# Patient Record
Sex: FEMALE | Race: WHITE | Employment: FULL TIME | URBAN - METROPOLITAN AREA
[De-identification: names, ages, dates, MRNs, and addresses within clinical notes are randomized per-mention and may not be internally consistent; named-entity substitution may affect disease eponyms.]

---

## 2021-12-07 ENCOUNTER — HOSPITAL ENCOUNTER (OUTPATIENT)
Age: 51
Setting detail: OBSERVATION
Discharge: HOME | End: 2021-12-08
Attending: PSYCHIATRY & NEUROLOGY | Admitting: PSYCHIATRY & NEUROLOGY
Payer: COMMERCIAL

## 2021-12-07 DIAGNOSIS — F41.9 ANXIETY: Primary | ICD-10-CM

## 2021-12-07 PROBLEM — F33.2 MDD (MAJOR DEPRESSIVE DISORDER), RECURRENT SEVERE, WITHOUT PSYCHOSIS (HCC): Status: ACTIVE | Noted: 2021-12-07

## 2021-12-07 LAB
ACETAMINOPHEN LEVEL: <5 UG/ML (ref 10–30)
ALBUMIN SERPL-MCNC: 4.5 G/DL (ref 3.5–4.6)
ALP BLD-CCNC: 59 U/L (ref 40–130)
ALT SERPL-CCNC: 16 U/L (ref 0–33)
AMPHETAMINE SCREEN, URINE: NORMAL
ANION GAP SERPL CALCULATED.3IONS-SCNC: 13 MEQ/L (ref 9–15)
AST SERPL-CCNC: 14 U/L (ref 0–35)
BARBITURATE SCREEN URINE: NORMAL
BASOPHILS ABSOLUTE: 0.1 K/UL (ref 0–0.2)
BASOPHILS RELATIVE PERCENT: 0.6 %
BENZODIAZEPINE SCREEN, URINE: NORMAL
BILIRUB SERPL-MCNC: <0.2 MG/DL (ref 0.2–0.7)
BILIRUBIN URINE: NEGATIVE
BLOOD, URINE: NEGATIVE
BUN BLDV-MCNC: 15 MG/DL (ref 6–20)
CALCIUM SERPL-MCNC: 9.4 MG/DL (ref 8.5–9.9)
CANNABINOID SCREEN URINE: NORMAL
CHLORIDE BLD-SCNC: 101 MEQ/L (ref 95–107)
CHOLESTEROL, TOTAL: 193 MG/DL (ref 0–199)
CLARITY: CLEAR
CO2: 23 MEQ/L (ref 20–31)
COCAINE METABOLITE SCREEN URINE: NORMAL
COLOR: YELLOW
CREAT SERPL-MCNC: 0.46 MG/DL (ref 0.5–0.9)
EKG ATRIAL RATE: 67 BPM
EKG P AXIS: 54 DEGREES
EKG P-R INTERVAL: 142 MS
EKG Q-T INTERVAL: 426 MS
EKG QRS DURATION: 98 MS
EKG QTC CALCULATION (BAZETT): 450 MS
EKG R AXIS: 45 DEGREES
EKG T AXIS: 47 DEGREES
EKG VENTRICULAR RATE: 67 BPM
EOSINOPHILS ABSOLUTE: 0.1 K/UL (ref 0–0.7)
EOSINOPHILS RELATIVE PERCENT: 0.8 %
ETHANOL PERCENT: NORMAL G/DL
ETHANOL: <10 MG/DL (ref 0–0.08)
GFR AFRICAN AMERICAN: >60
GFR NON-AFRICAN AMERICAN: >60
GLOBULIN: 2.6 G/DL (ref 2.3–3.5)
GLUCOSE BLD-MCNC: 101 MG/DL (ref 60–115)
GLUCOSE BLD-MCNC: 149 MG/DL (ref 70–99)
GLUCOSE BLD-MCNC: 240 MG/DL (ref 60–115)
GLUCOSE URINE: NEGATIVE MG/DL
HCG(URINE) PREGNANCY TEST: NEGATIVE
HCT VFR BLD CALC: 44 % (ref 37–47)
HDLC SERPL-MCNC: 47 MG/DL (ref 40–59)
HEMOGLOBIN: 14.5 G/DL (ref 12–16)
KETONES, URINE: NEGATIVE MG/DL
LDL CHOLESTEROL CALCULATED: 119 MG/DL (ref 0–129)
LEUKOCYTE ESTERASE, URINE: NEGATIVE
LYMPHOCYTES ABSOLUTE: 2.9 K/UL (ref 1–4.8)
LYMPHOCYTES RELATIVE PERCENT: 22.8 %
Lab: NORMAL
MCH RBC QN AUTO: 29.5 PG (ref 27–31.3)
MCHC RBC AUTO-ENTMCNC: 33 % (ref 33–37)
MCV RBC AUTO: 89.4 FL (ref 82–100)
METHADONE SCREEN, URINE: NORMAL
MONOCYTES ABSOLUTE: 0.8 K/UL (ref 0.2–0.8)
MONOCYTES RELATIVE PERCENT: 6.1 %
NEUTROPHILS ABSOLUTE: 8.8 K/UL (ref 1.4–6.5)
NEUTROPHILS RELATIVE PERCENT: 69.7 %
NITRITE, URINE: NEGATIVE
OPIATE SCREEN URINE: NORMAL
OXYCODONE URINE: NORMAL
PDW BLD-RTO: 14.6 % (ref 11.5–14.5)
PERFORMED ON: ABNORMAL
PERFORMED ON: NORMAL
PH UA: 5 (ref 5–9)
PHENCYCLIDINE SCREEN URINE: NORMAL
PLATELET # BLD: 267 K/UL (ref 130–400)
POTASSIUM SERPL-SCNC: 4.1 MEQ/L (ref 3.4–4.9)
PROPOXYPHENE SCREEN: NORMAL
PROTEIN UA: NEGATIVE MG/DL
RBC # BLD: 4.92 M/UL (ref 4.2–5.4)
SALICYLATE, SERUM: <0.3 MG/DL (ref 15–30)
SARS-COV-2, NAAT: NOT DETECTED
SODIUM BLD-SCNC: 137 MEQ/L (ref 135–144)
SPECIFIC GRAVITY UA: 1.01 (ref 1–1.03)
TOTAL CK: 29 U/L (ref 0–170)
TOTAL PROTEIN: 7.1 G/DL (ref 6.3–8)
TRIGL SERPL-MCNC: 135 MG/DL (ref 0–150)
TSH SERPL DL<=0.05 MIU/L-ACNC: 1.56 UIU/ML (ref 0.44–3.86)
URINE REFLEX TO CULTURE: NORMAL
UROBILINOGEN, URINE: 0.2 E.U./DL
WBC # BLD: 12.6 K/UL (ref 4.8–10.8)

## 2021-12-07 PROCEDURE — 80307 DRUG TEST PRSMV CHEM ANLYZR: CPT

## 2021-12-07 PROCEDURE — 99220 PR INITIAL OBSERVATION CARE/DAY 70 MINUTES: CPT | Performed by: PSYCHIATRY & NEUROLOGY

## 2021-12-07 PROCEDURE — 87635 SARS-COV-2 COVID-19 AMP PRB: CPT

## 2021-12-07 PROCEDURE — 36415 COLL VENOUS BLD VENIPUNCTURE: CPT

## 2021-12-07 PROCEDURE — 93005 ELECTROCARDIOGRAM TRACING: CPT | Performed by: PSYCHIATRY & NEUROLOGY

## 2021-12-07 PROCEDURE — 84703 CHORIONIC GONADOTROPIN ASSAY: CPT

## 2021-12-07 PROCEDURE — 6370000000 HC RX 637 (ALT 250 FOR IP): Performed by: STUDENT IN AN ORGANIZED HEALTH CARE EDUCATION/TRAINING PROGRAM

## 2021-12-07 PROCEDURE — 6370000000 HC RX 637 (ALT 250 FOR IP): Performed by: PSYCHIATRY & NEUROLOGY

## 2021-12-07 PROCEDURE — G0378 HOSPITAL OBSERVATION PER HR: HCPCS

## 2021-12-07 PROCEDURE — 80179 DRUG ASSAY SALICYLATE: CPT

## 2021-12-07 PROCEDURE — 81003 URINALYSIS AUTO W/O SCOPE: CPT

## 2021-12-07 PROCEDURE — 82550 ASSAY OF CK (CPK): CPT

## 2021-12-07 PROCEDURE — 84443 ASSAY THYROID STIM HORMONE: CPT

## 2021-12-07 PROCEDURE — 99284 EMERGENCY DEPT VISIT MOD MDM: CPT

## 2021-12-07 PROCEDURE — 80143 DRUG ASSAY ACETAMINOPHEN: CPT

## 2021-12-07 PROCEDURE — 80061 LIPID PANEL: CPT

## 2021-12-07 PROCEDURE — 93010 ELECTROCARDIOGRAM REPORT: CPT | Performed by: INTERNAL MEDICINE

## 2021-12-07 PROCEDURE — 82077 ASSAY SPEC XCP UR&BREATH IA: CPT

## 2021-12-07 PROCEDURE — 85025 COMPLETE CBC W/AUTO DIFF WBC: CPT

## 2021-12-07 PROCEDURE — 80053 COMPREHEN METABOLIC PANEL: CPT

## 2021-12-07 RX ORDER — TRAZODONE HYDROCHLORIDE 50 MG/1
50 TABLET ORAL NIGHTLY PRN
Status: DISCONTINUED | OUTPATIENT
Start: 2021-12-07 | End: 2021-12-07

## 2021-12-07 RX ORDER — BENZTROPINE MESYLATE 1 MG/ML
2 INJECTION INTRAMUSCULAR; INTRAVENOUS 2 TIMES DAILY PRN
Status: DISCONTINUED | OUTPATIENT
Start: 2021-12-07 | End: 2021-12-08 | Stop reason: HOSPADM

## 2021-12-07 RX ORDER — ESCITALOPRAM OXALATE 10 MG/1
15 TABLET ORAL DAILY
Status: DISCONTINUED | OUTPATIENT
Start: 2021-12-07 | End: 2021-12-08 | Stop reason: HOSPADM

## 2021-12-07 RX ORDER — HYDROXYZINE PAMOATE 50 MG/1
50 CAPSULE ORAL EVERY 6 HOURS PRN
Status: DISCONTINUED | OUTPATIENT
Start: 2021-12-07 | End: 2021-12-08 | Stop reason: HOSPADM

## 2021-12-07 RX ORDER — HALOPERIDOL 5 MG
5 TABLET ORAL EVERY 6 HOURS PRN
Status: DISCONTINUED | OUTPATIENT
Start: 2021-12-07 | End: 2021-12-08 | Stop reason: HOSPADM

## 2021-12-07 RX ORDER — HALOPERIDOL 5 MG/ML
5 INJECTION INTRAMUSCULAR EVERY 6 HOURS PRN
Status: DISCONTINUED | OUTPATIENT
Start: 2021-12-07 | End: 2021-12-08 | Stop reason: HOSPADM

## 2021-12-07 RX ORDER — MAGNESIUM HYDROXIDE/ALUMINUM HYDROXICE/SIMETHICONE 120; 1200; 1200 MG/30ML; MG/30ML; MG/30ML
30 SUSPENSION ORAL PRN
Status: DISCONTINUED | OUTPATIENT
Start: 2021-12-07 | End: 2021-12-08 | Stop reason: HOSPADM

## 2021-12-07 RX ORDER — TRAZODONE HYDROCHLORIDE 50 MG/1
50 TABLET ORAL NIGHTLY
Status: DISCONTINUED | OUTPATIENT
Start: 2021-12-07 | End: 2021-12-08 | Stop reason: HOSPADM

## 2021-12-07 RX ORDER — HYDROXYZINE HYDROCHLORIDE 50 MG/ML
50 INJECTION, SOLUTION INTRAMUSCULAR EVERY 6 HOURS PRN
Status: DISCONTINUED | OUTPATIENT
Start: 2021-12-07 | End: 2021-12-08 | Stop reason: HOSPADM

## 2021-12-07 RX ORDER — ACETAMINOPHEN 80 MG
TABLET,CHEWABLE ORAL ONCE
Status: COMPLETED | OUTPATIENT
Start: 2021-12-07 | End: 2021-12-07

## 2021-12-07 RX ORDER — DEXTROSE MONOHYDRATE 50 MG/ML
100 INJECTION, SOLUTION INTRAVENOUS PRN
Status: DISCONTINUED | OUTPATIENT
Start: 2021-12-07 | End: 2021-12-08 | Stop reason: HOSPADM

## 2021-12-07 RX ORDER — ACETAMINOPHEN 325 MG/1
650 TABLET ORAL EVERY 4 HOURS PRN
Status: DISCONTINUED | OUTPATIENT
Start: 2021-12-07 | End: 2021-12-08 | Stop reason: HOSPADM

## 2021-12-07 RX ORDER — DEXTROSE MONOHYDRATE 25 G/50ML
12.5 INJECTION, SOLUTION INTRAVENOUS PRN
Status: DISCONTINUED | OUTPATIENT
Start: 2021-12-07 | End: 2021-12-08 | Stop reason: HOSPADM

## 2021-12-07 RX ORDER — ESCITALOPRAM OXALATE 10 MG/1
10 TABLET ORAL DAILY
Status: ON HOLD | COMMUNITY
End: 2021-12-08 | Stop reason: HOSPADM

## 2021-12-07 RX ORDER — LORAZEPAM 1 MG/1
2 TABLET ORAL ONCE
Status: COMPLETED | OUTPATIENT
Start: 2021-12-07 | End: 2021-12-07

## 2021-12-07 RX ORDER — ACETAMINOPHEN 325 MG/1
650 TABLET ORAL ONCE
Status: COMPLETED | OUTPATIENT
Start: 2021-12-07 | End: 2021-12-07

## 2021-12-07 RX ORDER — NICOTINE POLACRILEX 4 MG
15 LOZENGE BUCCAL PRN
Status: DISCONTINUED | OUTPATIENT
Start: 2021-12-07 | End: 2021-12-08 | Stop reason: HOSPADM

## 2021-12-07 RX ADMIN — TRAZODONE HYDROCHLORIDE 50 MG: 50 TABLET ORAL at 20:40

## 2021-12-07 RX ADMIN — ESCITALOPRAM OXALATE 15 MG: 10 TABLET ORAL at 13:02

## 2021-12-07 RX ADMIN — ACETAMINOPHEN 650 MG: 325 TABLET, FILM COATED ORAL at 06:25

## 2021-12-07 RX ADMIN — Medication: at 13:02

## 2021-12-07 RX ADMIN — HYDROXYZINE PAMOATE 50 MG: 50 CAPSULE ORAL at 13:06

## 2021-12-07 RX ADMIN — LORAZEPAM 2 MG: 1 TABLET ORAL at 06:26

## 2021-12-07 ASSESSMENT — SLEEP AND FATIGUE QUESTIONNAIRES
DIFFICULTY STAYING ASLEEP: YES
AVERAGE NUMBER OF SLEEP HOURS: 8
DO YOU USE A SLEEP AID: YES
DIFFICULTY FALLING ASLEEP: NO
DO YOU HAVE DIFFICULTY SLEEPING: YES
DIFFICULTY ARISING: NO
SLEEP PATTERN: DISTURBED/INTERRUPTED SLEEP
RESTFUL SLEEP: NO

## 2021-12-07 ASSESSMENT — PATIENT HEALTH QUESTIONNAIRE - PHQ9: SUM OF ALL RESPONSES TO PHQ QUESTIONS 1-9: 19

## 2021-12-07 ASSESSMENT — ENCOUNTER SYMPTOMS
ABDOMINAL PAIN: 0
NAUSEA: 0
COUGH: 0
SHORTNESS OF BREATH: 0
WHEEZING: 0
PHOTOPHOBIA: 0
VOMITING: 0

## 2021-12-07 ASSESSMENT — PAIN SCALES - GENERAL: PAINLEVEL_OUTOF10: 5

## 2021-12-07 NOTE — PROGRESS NOTES
Per [facility] pharmacy, pt has not filled lexapro since beginning of November for a 30 day supply.  Electronically signed by Doe Jorge RN on 12/7/21 at 4:31 PM EST

## 2021-12-07 NOTE — ED NOTES
Report received from [facility]. Patient resting in bed. Voices no complaints or concerns at this time. Will continue to monitor.      Navjot Walters RN  12/07/21 1882       Najvot Walters RN  12/07/21 6542

## 2021-12-07 NOTE — PROGRESS NOTES
Patient was laying in bed in her room. She was awake and alert. She had a sad affect, was worrisome, anxious and was crying the entire times during the leisure assessment. Patient is depressed , overwhelmed and has many stressors. She was recently diagnosed with colon cancer and she was told she is diabetic. She stated she is feeling hopeless. Her fiance is an addict and she was upset he was using again. Patient was blaming herself, stating \"why am I not enough. \"    Her mother recently passed away and she was the caregiver. She was having racing thoughts and could not sleep. She feels she has limited support. She has no leisure interests.  Electronically signed by Venessa Siu, [address] on 12/7/2021 at 5:35 PM

## 2021-12-07 NOTE — ED NOTES
Report given to [facility] on 3WT. [employer] called for belongings and transport to [facility] [employer].      Jared Ruiz RN  12/07/21 7838

## 2021-12-07 NOTE — PROGRESS NOTES
Pt reports recently being diagnosed with colon cancer, states she has an appointment with a specialist at [facility] tomorrow at 1500. Pt also states she was recently told she is diabetic and is not on insulin/medication or checking her sugar at this time. Pt states she is going to see a doctor soon for diabetes to figure out if she needs medications. Notified hospitalist-Carmen Stone NP of diabetes diagnosis.  Electronically signed by Rosi Oscar RN on 12/7/21 at 5:05 PM EST

## 2021-12-07 NOTE — PROGRESS NOTES
Report received from [facility] from [facility]. Pt coming to unit on pink slip with a diagnosis of anxiety. 52yo female, BIB self for increased anxiety/ panic attack. Patient presents tearful, pressured speech, blaming self. Patient denies any SI, however makes multiple passive DW statements through out interview ie: \"I am at the end of my rope and just want it to be done, I don't know what to do, Im never good enough so why try\" Patient ws recently dx with DM and colon CA. Mother recently passed away. Juan is an addict and was using tonight. Per pt \"That was the final straw, I couldn't stop shaking and so I came here, my fireymundo messed up again. \" Patient reports a lot of responsibilities with no help and can not \"do this anymore. \" reports racing thoughts tonight and unable to stop crying and unable to sleep at all so drove self to ED. Pt denies drug use, endorses social ETOH use. Pt denies HI, AVH at this time. Pt reports thoughts of SI in the past, denies current d/t \"I have too many responsibilities. \" No h/o self harm reported.  Covid (-) Electronically signed by Jason Ferro RN on 12/7/21 at 9:52 AM EST

## 2021-12-07 NOTE — H&P
[facility] - Department of Psychiatry    History and Physical - Adult         CHIEF COMPLAINT:  Depression, SI    History obtained from:  patient    Patient was seen after discussing with the treatment team and reviewing the chart        CIRCUMSTANCES OF ADMISSION:     54yo female, BIB self for increased anxiety/ panic attack. Patient presents tearful, pressured speech, blaming self. Patient makes multiple passive DW statements through out interview ie: \"I am at the end of my rope and just want it to be done, I don't know what to do, Im never good enough so why try\" Patient ws recently dx with DM and colon CA. Mother recently passed away. Juan is an addict and was using tonight. Per pt \"That was the final straw, I couldn't stop shaking and so I came here, my fireymundo messed up again. \" Patient reports a lot of responsibilities with no help and can not \"do this anymore. \" reports racing thoughts tonight and unable to stop crying and unable to sleep at all so drove self to ED. HISTORY OF PRESENT ILLNESS:      The patient is a 46 y.o. female engaged live with [employer] work as para legal with significant past history of MDD, PHILIPPE, 2 daughters 29 and 23  Depression:  Severity: Rating mood to be around 2/10 (10- good)  Quality:melancholic  Worse all day  Content: Hopeless, worthless and helpless feeling  Suicidal thoughts - passive thoughts on and off  Associated symptoms:  Poor concentration, anhedonia, decrease motivation  Sleep and appetite- poor    Pt has been feeling anxious and panicky  Nothing is going to work, no purpose in living or fighting    Stressors:  Patient ws recently dx with DM and colon CA.  mom  in 2021  Patient ws recently dx with DM and colon CA. Juan is an addict and was using tonight. Work related stress      The patient is currently receiving care for the above psychiatric illness.     Medications Prior to Admission:   No medications prior to admission. Compliance:Lexapro    Psychiatric Review of Systems       Depression: yes     Priti or Hypomania:  no     Panic Attacks:  yes     Phobias:  no     Obsessions and Compulsions:  no     PTSD : no     Hallucinations:  no     Delusions:  no    Substance Abuse History:  ETOH: no   Marijuana: no  Opiates: no  Other Drugs: no      Past Psychiatric History:  Prior Diagnosis:  MDD, PHILIPPE  Psychiatrist: no  Therapist:no  Hospitalization: no  Hx of Suicidal Attempts: no  Hx of violence:  no  ECT: no  Previous discontinued Psychiatric Med Trials:     Past Medical History:        Diagnosis Date    Depression        Past Surgical History:    History reviewed. No pertinent surgical history. Allergies:   Patient has no known allergies. Family History  History reviewed. No pertinent family history. Social History:  Born and Raised: [place]  Describes Childhood:   supportive  Education: College  Employment: Employed full time  Relationships: engaged  Children: 2  Current Support: romantic partner    Legal Hx: none  Access to weapons?:  No      EXAMINATION:    REVIEW OF SYSTEMS:    ROS:  [x] All negative/unchanged except if checked.  Explain positive(checked items) below:  [] Constitutional  [] Eyes  [] Ear/Nose/Mouth/Throat  [] Respiratory  [] CV  [] GI  []   [] Musculoskeletal  [] Skin/Breast  [] Neurological  [] Endocrine  [] Heme/Lymph  [] Allergic/Immunologic    Explanation:     Vitals:  /60   Pulse 77   Temp 98.6 °F (37 °C) (Oral)   Resp 18   Ht 5' 3\" (1.6 m)   Wt 160 lb (72.6 kg)   LMP  (LMP Unknown)   SpO2 94%   BMI 28.34 kg/m²      Neurologic Exam:   Muscle Strength & Tone: full ROM  Gait: normal gait   Involuntary Movements: No    Mental Status Examination:    Level of consciousness:  within normal limits   Appearance:  ill-appearing  Behavior/Motor:  psychomotor retardation  Attitude toward examiner:  cooperative  Speech:  slow   Mood: decreased range and depressed  Affect:  Labile tearful  Thought processes:  slow   Thought content:  Suicidal Ideation:  passive  Cognition:  oriented to person, place, and time   Concentration poor  Memory intact  Insight poor   Judgement fair   Fund of Knowledge adequate    Mini Mental Status 30/30      DIAGNOSIS:     MDD recurrent severe   Panic disorder       RISK ASSESSMENT:    SUICIDE RISK ASSESSMENT: Moderate  HOMICIDE: Low  AGITATION/VIOLENCE: Low  ELOPEMENT: Low    LABS: REVIEWED TODAY:  Recent Labs     12/07/21 0412   WBC 12.6*   HGB 14.5        Recent Labs     12/07/21 0412      K 4.1      CO2 23   BUN 15   CREATININE 0.46*   GLUCOSE 149*     Recent Labs     12/07/21 0412   BILITOT <0.2   ALKPHOS 59   AST 14   ALT 16     Lab Results   Component Value Date    LABAMPH Neg 12/07/2021    BARBSCNU Neg 12/07/2021    LABBENZ Neg 12/07/2021    LABMETH Neg 12/07/2021    OPIATESCREENURINE Neg 12/07/2021    PHENCYCLIDINESCREENURINE Neg 12/07/2021    ETOH <10 12/07/2021     Lab Results   Component Value Date    TSH 1.560 12/07/2021     No results found for: LITHIUM  No results found for: VALPROATE, CBMZ  No results found for: LITHIUM, VALPROATE    FURTHER LABS ORDERED :      Radiology   No results found. EKG: TRACING REVIEWED    TREATMENT PLAN:    Risk Management:  suicide risk    Collateral Information:  Will obtain collateral information from the family or friends. Will obtain medical records as appropriate from out patient providers  Will consult the hospitalist for a physical exam to rule out any co-morbid physical condition. Home medication Reconciled       New Medications started during this admission :    See orders  Prn Haldol 5mg and Vistaril 50mg q6hr for extreme agitation. Trazodone as ordered for insomnia  Vistaril as ordered for anxiety  Discussed with the patient risk, benefit, alternative and common side effects for the  proposed medication treatment. Patient is consenting to the treatment.     Psychotherapy: Encourage participation in milieu and group therapy  Individual therapy as needed      Electronically signed by Yanira Parra MD on 12/7/2021 at 10:44 AM

## 2021-12-07 NOTE — PROGRESS NOTES
Pt assessed this afternoon after admission assessments. Reports feeling better at this moment with depression /10, and anxiety /10. Denies SI, HI, AVH at present time. Pt states being very overwhelmed with life, her Mom recently  and has some financial problems as well. Her Daughter called (Fidel Devries) and was concerned about her status. Relayed message to pt. Sleeping most of the afternoon and currently in her room resting.

## 2021-12-07 NOTE — ED PROVIDER NOTES
[address] [facility] ED  EMERGENCY DEPARTMENT ENCOUNTER      Pt Name: Carole Ahumada  MRN: [id number]  [place] 1970  Date of evaluation: 12/7/2021  Provider: Christine Blue Dr       Chief Complaint   Patient presents with    Panic Attack         HISTORY OF PRESENT ILLNESS   (Location/Symptom, Timing/Onset, Context/Setting, Quality, Duration, Modifying Factors, Severity)  Note limiting factors. Carole Ahumada is a 46 y.o. female who presents to the emergency department for evaluation of anxiety. Patient states she is overwhelmed has a lot of stressors in her life currently and developed a panic attack tonight. She states she could not sleep had racing thoughts. Per ED triage nurse patient is making passive comments about suicide stating it may be better if she is not here. She denies any active suicidal ideations or plan. No history of suicide attempt. She is not taking any medications. She denies AVH HI. No medical complaints. Denies EtOH or drugs. HPI    Nursing Notes were reviewed. REVIEW OF SYSTEMS    (2-9 systems for level 4, 10 or more for level 5)     Review of Systems   Constitutional: Negative for chills and fever. HENT: Negative for congestion. Eyes: Negative for photophobia. Respiratory: Negative for cough, shortness of breath and wheezing. Cardiovascular: Negative for chest pain and palpitations. Gastrointestinal: Negative for abdominal pain, nausea and vomiting. Genitourinary: Negative for dysuria, frequency and hematuria. Musculoskeletal: Negative for myalgias. Allergic/Immunologic: Negative for immunocompromised state. Neurological: Negative for dizziness, weakness and headaches. Psychiatric/Behavioral: Positive for dysphoric mood. Negative for hallucinations and suicidal ideas. The patient is nervous/anxious. All other systems reviewed and are negative.       Except as noted above the remainder of the review of systems was reviewed and negative. PAST MEDICAL HISTORY     Past Medical History:   Diagnosis Date    Depression          SURGICAL HISTORY     History reviewed. No pertinent surgical history. CURRENT MEDICATIONS       Previous Medications    No medications on file       ALLERGIES     Patient has no known allergies. FAMILY HISTORY     History reviewed. No pertinent family history. SOCIAL HISTORY       Social History     Socioeconomic History    Marital status:      Spouse name: None    Number of children: None    Years of education: None    Highest education level: None   Occupational History    None   Tobacco Use    Smoking status: Current Every Day Smoker    Smokeless tobacco: Never Used   Substance and Sexual Activity    Alcohol use: None    Drug use: None    Sexual activity: None   Other Topics Concern    None   Social History Narrative    None     Social Determinants of Health     Financial Resource Strain:     Difficulty of Paying Living Expenses: Not on file   Food Insecurity:     Worried About Running Out of Food in the Last Year: Not on file    Pedro of Food in the Last Year: Not on file   Transportation Needs:     Lack of Transportation (Medical): Not on file    Lack of Transportation (Non-Medical):  Not on file   Physical Activity:     Days of Exercise per Week: Not on file    Minutes of Exercise per Session: Not on file   Stress:     Feeling of Stress : Not on file   Social Connections:     Frequency of Communication with Friends and Family: Not on file    Frequency of Social Gatherings with Friends and Family: Not on file    Attends Restorationist Services: Not on file    Active Member of Clubs or Organizations: Not on file    Attends Club or Organization Meetings: Not on file    Marital Status: Not on file   Intimate Partner Violence:     Fear of Current or Ex-Partner: Not on file    Emotionally Abused: Not on file    Physically Abused: Not on file    Sexually Abused: Not on file   Housing Stability:     Unable to Pay for Housing in the Last Year: Not on file    Number of Leela in the Last Year: Not on file    Unstable Housing in the Last Year: Not on file       SCREENINGS                        PHYSICAL EXAM    (up to 7 for level 4, 8 or more for level 5)     ED Triage Vitals [12/07/21 0334]   BP Temp Temp Source Pulse Resp SpO2 Height Weight   (!) 155/87 98.6 °F (37 °C) Oral 109 20 95 % 5' 3\" (1.6 m) 160 lb (72.6 kg)       Physical Exam  Constitutional:       General: She is not in acute distress. Appearance: She is well-developed. She is not ill-appearing, toxic-appearing or diaphoretic. HENT:      Head: Normocephalic and atraumatic. Nose: Nose normal.      Mouth/Throat:      Mouth: Mucous membranes are moist.   Eyes:      Pupils: Pupils are equal, round, and reactive to light. Cardiovascular:      Rate and Rhythm: Normal rate and regular rhythm. Heart sounds: No murmur heard. No friction rub. No gallop. Pulmonary:      Effort: Pulmonary effort is normal.      Breath sounds: Normal breath sounds. No wheezing, rhonchi or rales. Abdominal:      General: Bowel sounds are normal. There is no distension. Palpations: Abdomen is soft. Tenderness: There is no abdominal tenderness. Musculoskeletal:         General: No swelling. Cervical back: Normal range of motion. Right lower leg: No edema. Left lower leg: No edema. Skin:     General: Skin is warm and dry. Capillary Refill: Capillary refill takes less than 2 seconds. Findings: No rash. Neurological:      General: No focal deficit present. Mental Status: She is alert and oriented to person, place, and time. Psychiatric:         Attention and Perception: Attention normal.         Mood and Affect: Affect is tearful. Speech: Speech normal.         Behavior: Behavior is cooperative. Thought Content:  Thought content does not include homicidal or suicidal ideation. Thought content does not include homicidal or suicidal plan. Comments: Very tearful          DIAGNOSTIC RESULTS     EKG: All EKG's are interpreted by the Emergency Department Physician who either signs or Co-signs this chart in the absence of a cardiologist.      RADIOLOGY:   Non-plain film images such as CT, Ultrasound and MRI are read by the radiologist. Plain radiographic images are visualized and preliminarily interpreted by the emergency physician with the below findings:      Interpretation per the Radiologist below, if available at the time of this note:    No orders to display         ED BEDSIDE ULTRASOUND:   Performed by ED Physician - none    LABS:  Labs Reviewed   ACETAMINOPHEN LEVEL - Abnormal; Notable for the following components:       Result Value    Acetaminophen Level <5 (*)     All other components within normal limits   CBC WITH AUTO DIFFERENTIAL - Abnormal; Notable for the following components:    WBC 12.6 (*)     RDW 14.6 (*)     Neutrophils Absolute 8.8 (*)     All other components within normal limits   COMPREHENSIVE METABOLIC PANEL - Abnormal; Notable for the following components:    Glucose 149 (*)     CREATININE 0.46 (*)     All other components within normal limits   SALICYLATE LEVEL - Abnormal; Notable for the following components:    Salicylate, Serum <6.9 (*)     All other components within normal limits   COVID-19, RAPID   CK   ETHANOL   LIPID PANEL   PREGNANCY, URINE   TSH WITHOUT REFLEX   URINE DRUG SCREEN   URINE RT REFLEX TO CULTURE       All other labs were within normal range or not returned as of this dictation. EMERGENCY DEPARTMENT COURSE and DIFFERENTIAL DIAGNOSIS/MDM:   Vitals:    Vitals:    12/07/21 0334 12/07/21 0739   BP: (!) 155/87 109/60   Pulse: 109 77   Resp: 20 18   Temp: 98.6 °F (37 °C)    TempSrc: Oral    SpO2: 95% 94%   Weight: 160 lb (72.6 kg)    Height: 5' 3\" (1.6 m)        MDM    Medically cleared.     Admitted to 3W psychiatry unit with dx of anxiety. Dr. Gearld Collet is accepting physician. Stable for admission. REASSESSMENT          CRITICAL CARE TIME   Total Critical Care time was 0 minutes, excluding separately reportable procedures. There was a high probability of clinically significant/life threatening deterioration in the patient's condition which required my urgent intervention. CONSULTS:  IP CONSULT TO HOSPITALIST  IP CONSULT TO SOCIAL WORK    PROCEDURES:  Unless otherwise noted below, none     Procedures        FINAL IMPRESSION      1. Anxiety          DISPOSITION/PLAN   DISPOSITION Admitted 12/07/2021 06:45:03 AM      PATIENT REFERRED TO:  No follow-up provider specified. DISCHARGE MEDICATIONS:  New Prescriptions    No medications on file     Controlled Substances Monitoring:     No flowsheet data found.     (Please note that portions of this note were completed with a voice recognition program.  Efforts were made to edit the dictations but occasionally words are mis-transcribed.)    María Morales PA-C (electronically signed)             María Morales PA-C  12/07/21 7757

## 2021-12-07 NOTE — PROGRESS NOTES
Pt did not attend healthy living group at 1300 despite staff encouragement.  Electronically signed by Ilda Ferrer RN on 12/7/21 at 4:24 PM EST

## 2021-12-07 NOTE — PROGRESS NOTES
Pt arrived from the [place] via [facility] accompanied by two staff. Pt ambulated to room without difficulty. Skin check completed by two R.N.'s.  No skin issues noted. Contraband check completed. Toiletries and water placed in room. Pt oriented to place and time. Ate  lunch and currently resting in room.

## 2021-12-07 NOTE — PROGRESS NOTES
Behavioral Services  Medicare Certification Upon Admission    I certify that this patient's inpatient psychiatric hospital admission is medically necessary for:    [x] (1) Treatment which could reasonably be expected to improve this patient's condition,       [x] (2) Or for diagnostic study;     AND     [x](2) The inpatient psychiatric services are provided while the individual is under the care of a physician and are included in the individualized plan of care.     Estimated length of stay/service 3-5 days    Plan for post-hospital care OP care    Electronically signed by Yanira Parra MD on 12/7/2021 at 10:44 AM

## 2021-12-07 NOTE — PROGRESS NOTES
Patient did not attend group despite staff encouragement.   Electronically signed by Ron Arboleda on 12/7/2021 at 5:29 PM

## 2021-12-07 NOTE — PROGRESS NOTES
Patient arrived to unit via wheelchair accompanied by staff. Pt ambulated with steady gait to assigned room. Skin assessment and contraband check complete by this nurse and Geovani Churchill RN. Skin intact, no contraband found. Pt provided with breakfast, hygiene supplies, and fluids. Pt states she wants to eat and nap for a bit before completing admission assessment. Will continue to monitor.  Electronically signed by Faizan Lopez RN on 12/7/21 at 9:54 AM EST

## 2021-12-07 NOTE — PROGRESS NOTES
Pt. refused to attend the 1100 skills group, despite staff encouragement. Electronically signed by Kiley Mason, [address] on 12/7/2021 at 3:03 PM

## 2021-12-07 NOTE — ED NOTES
Awaiting [facility] admit per report from [facility]. Resting quietly with no acute distress noted.      Louis Díaz RN  12/07/21 1931

## 2021-12-07 NOTE — ED NOTES
Continue to rest quietly with eyes closed & no acute distress noted. Breakfast tray remains untouched.      Shruthi Johnson RN  12/07/21 0705

## 2021-12-07 NOTE — PLAN OF CARE
Pt arrived from [place] via [facility] accompanied by two staff. Pt ambulated to room without difficulty. Skin check completed by two R.N.'s.  No skin issues noted. Contraband check completed. Toiletries and water placed in room. Pt oriented to place and time. Currently resting in room.

## 2021-12-08 VITALS
TEMPERATURE: 97.4 F | OXYGEN SATURATION: 95 % | RESPIRATION RATE: 18 BRPM | DIASTOLIC BLOOD PRESSURE: 67 MMHG | HEART RATE: 88 BPM | SYSTOLIC BLOOD PRESSURE: 106 MMHG | WEIGHT: 160 LBS | HEIGHT: 63 IN | BODY MASS INDEX: 28.35 KG/M2

## 2021-12-08 PROBLEM — F41.0 PANIC DISORDER: Status: ACTIVE | Noted: 2021-12-07

## 2021-12-08 LAB
GLUCOSE BLD-MCNC: 132 MG/DL (ref 60–115)
PERFORMED ON: ABNORMAL

## 2021-12-08 PROCEDURE — 6370000000 HC RX 637 (ALT 250 FOR IP): Performed by: PSYCHIATRY & NEUROLOGY

## 2021-12-08 PROCEDURE — G0378 HOSPITAL OBSERVATION PER HR: HCPCS

## 2021-12-08 PROCEDURE — 99217 PR OBSERVATION CARE DISCHARGE MANAGEMENT: CPT | Performed by: PSYCHIATRY & NEUROLOGY

## 2021-12-08 RX ORDER — TRAZODONE HYDROCHLORIDE 50 MG/1
50 TABLET ORAL NIGHTLY
Qty: 15 TABLET | Refills: 2 | Status: SHIPPED | OUTPATIENT
Start: 2021-12-08

## 2021-12-08 RX ORDER — ESCITALOPRAM OXALATE 5 MG/1
15 TABLET ORAL DAILY
Qty: 45 TABLET | Refills: 1 | Status: SHIPPED | OUTPATIENT
Start: 2021-12-09

## 2021-12-08 RX ADMIN — ESCITALOPRAM OXALATE 15 MG: 10 TABLET ORAL at 09:21

## 2021-12-08 ASSESSMENT — LIFESTYLE VARIABLES: HISTORY_ALCOHOL_USE: NO

## 2021-12-08 NOTE — GROUP NOTE
Group Therapy Note    Date: 12/8/2021    Group Start Time: 1100  Group End Time: 1200  Group Topic: Psychoeducation    [facility]    Kaleb Navarro, CTRS        Group Therapy Note    Attendees:4         Patient's Goal:  \"to stay positive\"    Notes:  Pt. attended the 1100 skill group. Worked on picture with interst. Shared her desire to travel again. Happy to be going home. Status After Intervention:  Improved    Participation Level:  Active Listener and Interactive    Participation Quality: Appropriate, Attentive and Sharing      Speech:  normal      Thought Process/Content: Logical      Affective Functioning: Congruent      Mood: calm, happy      Level of consciousness:  Alert, Oriented x4 and Attentive      Response to Learning: Progressing to goal      Endings: None Reported    Modes of Intervention: Education, Support, Socialization and Activity      Discipline Responsible: Psychoeducational Specialist      Signature:  Michael Suarez

## 2021-12-08 NOTE — PROGRESS NOTES
Morning Community Meeting Topics    Carole Ahumada attended the morning community meeting on 12/8/21. Topics discussed today     [x] Introduction   Day of the week and date   Mask distribution   Current mask requirements  [x]Teams   Explanation of  Green and Blue team criteria   Nurses assigned to each team for today   Explanation about green and blue paper  o Date  o Patient's Name  o Patient's Nurse  o Goals  [x] Visitation   Announce the visiting hours for the day   Announce which team is allowed to have visitors for the day   Review any updated Covid 19 requirements for visitors during visitation  o Vaccine Card or negative Covid test within 48 hours of visit  o State Identification   Patients are reminded to alert the  at least 1 hour before visitation   [x] Unit Orientation   Coffee use   Phone location and etiquette   Shower locations  [employer] and dryer location and process   Common area expectations   Staff rounds expectation  [x] Meals    Educate patient to the menu  o The patient is encouraged to fill out the menu to get preferences at mealtime  o The patient is educated that if they do not fill out the menu, they will get the standard tray  o The coffee pot is decaf, patient encouraged to order regular coffee from menu.    Educate patient to the meal process   Patient encouraged to eat snacks provided twice daily  o Snacks may stay in patient room     [x] Discharge Process   Discharge expectations   Fill out the survey after discharge   [x] Hygiene   Daily showers encouraged  o Showers availability discussed    Daily dressing encouraged  o Discussed wearing street clothing   Education provided on where to place linens and clothing  o Linens in the hamper  o personal clothing does not go into the linen hamper  [x] Group    Patient encouraged to attend group provided   Time of Group Meetings discussed   Gentle reminder that attendance is a Physician order  [x] Movement   Chair exercises completed   Stretching completed  Notes: GOAL:\" to stay positive\" Electronically signed by Ashley Gandara on 12/8/2021 at 1:05 PM

## 2021-12-08 NOTE — CARE COORDINATION
Brief Intervention and Referral to Treatment Summary    Patient was provided PHQ-9, AUDIT and DAST Screening:      PHQ-9 Score: 19  AUDIT Score:  0  DAST Score:  0    Patients substance use is considered     Low Risk/[facility]  Moderate Risk  Harmful  Dependent    Patients depression is considered:     Minimal  Mild   Moderatex  Moderately Severe  Severe    Brief Education Was Provided    Patient was receptivex  Patient was not receptive      Brief Intervention Is Provided (Only for AUDIT or DAST)     Patient reports readiness to decrease and/or stop use and a plan was discussed   Patient denies readiness to decrease and/or stop use and a plan was not discussed      Recommendations/Referrals for Brief and/or Specialized Treatment Provided to Patient   Patient denies alcohol or drug use. Patient will follow up with [facility].

## 2021-12-08 NOTE — DISCHARGE INSTR - DIET

## 2021-12-08 NOTE — CARE COORDINATION
FAMILY COLLATERAL NOTE    Family/Support Name: Cain Heimlich  Contact #: [phone]  Relationship to Pt: daughter      Placed call to above for collateral. No answer, did not leave a message.      Response:  No answer        Elisha Singletary, [facility]

## 2021-12-08 NOTE — CARE COORDINATION
FAMILY COLLATERAL NOTE    Family/Support Name: Bhavik Shook  Contact #: [phone]  Relationship to Pt: giulianoreymundo      Placed call to above for collateral. No answer, left a message requesting a return call.      Response:  No answer, KIRSTEN Boyle, [facility]

## 2021-12-08 NOTE — PROGRESS NOTES
Patient did not attend group despite staff encouragement.   Electronically signed by Tres Hargrove on 12/7/2021 at 9:50 PM

## 2021-12-08 NOTE — CONSULTS
[address] MEDICINE    HISTORY AND PHYSICAL EXAM    PATIENT NAME:  Daksha Parra    MRN:  [id number]  SERVICE DATE:  2021   SERVICE TIME:  9:07 AM    Primary Care Physician: Deloris Acosta MD         SUBJECTIVE  CHIEF COMPLAINT:  Medically appropriate for inpatient psychiatry admission. Consult for medical H/P encounter. HPI:  This is a 46 y.o. female who presents with  PMHx DM type II, depression, GERD, positive colon cancer screening,  presented to emergency room with anxiety and panic attacks. Reports feeling overwhelmed and stressed out due to recent positive colon screening and issues with her significant other's addiction. Denies SI. Patient cleared from emergency room for psychiatric care. Patient Seen, Chart, Labs, Radiologystudies, and Consults reviewed. Patient denies headache, chest pain, shortness of breath, N/V/D/C, fever/chills.        PAST MEDICAL HISTORY:    Past Medical History:   Diagnosis Date    [facility] ([facility] [address] [facility]     Depression     [facility]     GERD (gastroesophageal reflux disease)      PAST SURGICAL HISTORY:    Past Surgical History:   Procedure Laterality Date     SECTION      x's 2    EYE SURGERY      OTHER SURGICAL HISTORY      uterine ablation     FAMILY HISTORY:    Family History   Problem Relation Age of Onset    COPD Mother      SOCIAL HISTORY:    Social History     Socioeconomic History    Marital status:      Spouse name: Christiano Marmolejo    Number of children: 2    Years of education: 15    Highest education level: Not on file   Occupational History    Occupation: para legal   Tobacco Use    Smoking status: Current Every Day Smoker     Packs/day: 1.00     Years: 20.00     Pack years: 20.00     Types: Cigarettes     Start date: 2021    Smokeless tobacco: Never Used   Vaping Use    Vaping Use: Never used   Substance and Sexual Activity    Alcohol use: Not Currently    Drug use: Never    Sexual activity: Yes Partners: Male   Other Topics Concern    Not on file   Social History Narrative    Not on file     Social Determinants of Health     Financial Resource Strain:     Difficulty of Paying Living Expenses: Not on file   Food Insecurity:     Worried About Running Out of Food in the Last Year: Not on file    Pedro of Food in the Last Year: Not on file   Transportation Needs:     Lack of Transportation (Medical): Not on file    Lack of Transportation (Non-Medical):  Not on file   Physical Activity:     Days of Exercise per Week: Not on file    Minutes of Exercise per Session: Not on file   Stress:     Feeling of Stress : Not on file   Social Connections:     Frequency of Communication with Friends and Family: Not on file    Frequency of Social Gatherings with Friends and Family: Not on file    Attends Gnosticist Services: Not on file    Active Member of [address] [place] [address] or Organizations: Not on file    Attends Club or Organization Meetings: Not on file    Marital Status: Not on file   Intimate Partner Violence:     Fear of Current or Ex-Partner: Not on file    Emotionally Abused: Not on file    Physically Abused: Not on file    Sexually Abused: Not on file   Housing Stability:     Unable to Pay for Housing in the Last Year: Not on file    Number of [place] in the Last Year: Not on file    Unstable Housing in the Last Year: Not on file     MEDICATIONS:    Current Facility-Administered Medications   Medication Dose Route Frequency Provider Last Rate Last Admin    acetaminophen (TYLENOL) tablet 650 mg  650 mg Oral Q4H PRN Odalys Traylor MD        magnesium hydroxide (MILK OF MAGNESIA) 400 MG/5ML suspension 30 mL  30 mL Oral Daily PRN Odalys Traylor MD        aluminum & magnesium hydroxide-simethicone (MAALOX) 200-200-20 MG/5ML suspension 30 mL  30 mL Oral PRN Odalys Traylor MD        haloperidol (HALDOL) tablet 5 mg  5 mg Oral Q6H PRN Odalys Traylor MD        Or    haloperidol lactate (HALDOL) injection 5 mg  5 mg IntraMUSCular Q6H PRN Sandro Maza MD        benztropine mesylate (COGENTIN) injection 2 mg  2 mg IntraMUSCular BID PRN Sandro Maza MD        hydrOXYzine (VISTARIL) capsule 50 mg  50 mg Oral Q6H PRN Sandro Maza MD   50 mg at 12/07/21 1306    Or    hydrOXYzine (VISTARIL) injection 50 mg  50 mg IntraMUSCular Q6H PRN Sandro Maza MD        escitalopram (LEXAPRO) tablet 15 mg  15 mg Oral Daily Tati Irizarry MD   15 mg at 12/07/21 1302    traZODone (DESYREL) tablet 50 mg  50 mg Oral Nightly Tati Irizarry MD   50 mg at 12/07/21 2040    insulin lispro (HUMALOG) injection vial 0-12 Units  0-12 Units SubCUTAneous TID WC Lilliana Bras, APRN - NP        insulin lispro (HUMALOG) injection vial 0-6 Units  0-6 Units SubCUTAneous Nightly Terrell Bras, APRN - NP        glucose (GLUTOSE) 40 % oral gel 15 g  15 g Oral PRN Terrell Bras, APRN - NP        dextrose 50 % IV solution  12.5 g IntraVENous PRN Terrell Bras, APRN - NP        glucagon (rDNA) injection 1 mg  1 mg IntraMUSCular PRN Terrell Bras, APRN - NP        dextrose 5 % solution  100 mL/hr IntraVENous PRN Terrell Bras, APRN - NP           ALLERGIES: Patient has no known allergies. REVIEW OF SYSTEM:   ROS as noted in HPI, 12 point ROS reviewed and otherwise negative. OBJECTIVE  PHYSICAL EXAM: /77   Pulse 78   Temp 99 °F (37.2 °C)   Resp 16   Ht 5' 3\" (1.6 m)   Wt 160 lb (72.6 kg)   LMP  (LMP Unknown)   SpO2 96%   Breastfeeding No   BMI 28.34 kg/m²   CONSTITUTIONAL:  awake, alert, cooperative, no apparent distress, and appears stated age  EYES: Pupils equal, round and reactive to light, conjunctiva normal  ENT:  Normocephalic, without obvious abnormality, atraumatic, sinuses nontender on palpation, external ears without lesions, oral pharynx with moist mucus membranes, tonsils without erythema or exudates, gums normal and good dentition.   NECK:  Supple, symmetrical, trachea midline  LUNGS: Clear to auscultation bilaterally, no crackles or wheezing  CARDIOVASCULAR: Regular rate and rhythm, normal S1 and S2  ABDOMEN: Normal bowel sounds, soft, non-distended  MUSCULOSKELETAL:  There is no redness, warmth, or swelling of the joints. NEUROLOGIC:  Awake, alert, oriented to name, place and time. SKIN:  Warm and dry  DATA:     Diagnostic tests reviewed for today's visit:    Most recent labs and imaging results reviewed. ASSESSMENT AND PLAN    46 y.o. female with PMH DM type II, depression, GERD, positive colon cancer screening, presented with the following:    Anxiety   Patient admitted to behavorial health for evaluation and treatment     DM type II: Newly diagnosed. Has not seen PCP since being notified of DM diagnoses. Does not wish to start metformin while on 3W. States she wants to wait until seen by PCP    Positive colon cancer screening: States she has an appt scheduled with GI today. This is only a history and physical examination and not medical management. The patient is to contact and follow up with their primary care physician and go over any abnormal labs, imaging, findings, medical concerns, or conditions that we have and have not addressed during this encounter.     Plan of care discussed with: patient    SIGNATURE: ALYSHA Amaya NP  DATE: December 8, 2021  TIME: 9:07 AM

## 2021-12-08 NOTE — CARE COORDINATION
Psychosocial Assessment    Current Level of Psychosocial Functioning     Independent x  Dependent    Minimal Assist     Comments:  Lives with daughter and fiance    Psychosocial High Risk Factors (check all that apply)    Unable to obtain meds   Chronic illness/pain    Substance abuse   Lack of Family Support   Financial stress   Isolation   Inadequate Community Resources  Suicide attempt(s)  Not taking medications   Victim of crime   Developmental Delay  Unable to manage personal needs    Age 72 or older   Homeless  No transportation   Readmission within 30 days  Unemployment  Traumatic Event    Family/Supports identified: fiance and daughter    Sexual Orientation:  heterosexual  Patient Strengths:    Patient Barriers: depression and grief issues    Safety plan: comepleted    CMHC/MH history: [facility]    Plan of Care:  medication management, group/individual therapies, family meetings, psycho -education, treatment team meetings to assist with stabilization    Initial Discharge Plan:  Call collateral. Intake at [facility]    Clinical Summary:  Patient stated she was overwhelmed with her medical issues, life stressors and mom passing in June. Patient was her caretaker for 5 years. Patient stated that she never had thoughts of hurting herself she was just very down. Patient is agreeable to counseling and will return to work next week. Denies current SI/HI.

## 2021-12-08 NOTE — CARE COORDINATION
Discharge instructions reviewed verbally and in writing including f/u appointments. Patient verbalizes understanding and signed as such. All belongings returned for discharge. Patient denies SI, HI, A/V hallucinations, mood is stable.    Discharged with own transportation home

## 2021-12-08 NOTE — PROGRESS NOTES
Patient did not attend group despite staff encouragement.   Electronically signed by Dorian Dennis on 12/7/2021 at 9:50 PM

## 2021-12-08 NOTE — DISCHARGE SUMMARY
DISCHARGE SUMMARY      Patient ID:  Chucky Fisher  [id number]  16 y.o.  1970      Admit date: 2021    Discharge date and time: 2021    Admitting Physician: Ora Dancer, MD     Discharge Physician: Dr Teddy Mcmullen MD    Admission Diagnoses: Anxiety [F41.9]  MDD (major depressive disorder), recurrent severe, without psychosis ([facility] [address] [F33.2]    Admission Condition: poor    Discharged Condition: stable    Admission Circumstance:       54yo female, BIB self for increased anxiety/ panic attack. Patient presents tearful, pressured speech, blaming self. Patient makes multiple passive DW statements through out interview ie: \"I am at the end of my rope and just want it to be done, I don't know what to do, Im never good enough so why try\" Patient ws recently dx with DM and colon CA. Mother recently passed away. Juan is an addict and was using tonight. Per pt \"That was the final straw, I couldn't stop shaking and so I came here, my fiance messed up again. \" Patient reports a lot of responsibilities with no help and can not \"do this anymore. \" reports racing thoughts tonight and unable to stop crying and unable to sleep at all so drove self to ED.      HISTORY OF PRESENT ILLNESS:       The patient is a 46 y.o. 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  mom  in 2021  Patient ws recently dx with DM and colon CA. Juan is an addict and was using tonight.   Work related stress        The patient is currently receiving care for the above psychiatric illness.     Medications Prior to Admission:   Prescriptions Prior to Admission   No medications prior to admission.        Compliance:"[employer]apro     Psychiatric Review of Systems       Depression: yes     Priti or Hypomania:  no     Panic Attacks:  yes     Phobias:  no     Obsessions and Compulsions:  no     PTSD : no     Hallucinations:  no     Delusions:  no     Substance Abuse History:  ETOH: no   Marijuana: no  Opiates: no  Other Drugs: no        Past Psychiatric History:  Prior Diagnosis:  MDD, PHILIPPE  Psychiatrist: no  Therapist:no  Hospitalization: no  Hx of Suicidal Attempts: no  Hx of violence:  no  ECT: no      PAST MEDICAL/PSYCHIATRIC HISTORY:   Past Medical History:   Diagnosis Date    [facility] ([facility] [address] [facility]     Depression     Diabetes mellitus (HCC)     GERD (gastroesophageal reflux disease)        FAMILY/SOCIAL HISTORY:  Family History   Problem Relation Age of Onset    COPD Mother      Social History     Socioeconomic History    Marital status:      Spouse name: Gabriella Jhaveri    Number of children: 2    Years of education: 15    Highest education level: Not on file   Occupational History    Occupation: para legal   Tobacco Use    Smoking status: Current Every Day Smoker     Packs/day: 1.00     Years: 20.00     Pack years: 20.00     Types: Cigarettes     Start date: 12/6/2021    Smokeless tobacco: Never Used   Vaping Use    Vaping Use: Never used   Substance and Sexual Activity    Alcohol use: Not Currently    Drug use: Never    Sexual activity: Yes     Partners: Male   Other Topics Concern    Not on file   Social History Narrative    Not on file     Social Determinants of Health     Financial Resource Strain:     Difficulty of Paying Living Expenses: Not on file   Food Insecurity:     Worried About Running Out of Food in the Last Year: Not on file    Pedro of Food in the Last Year: Not on file Transportation Needs:     Lack of Transportation (Medical): Not on file    Lack of Transportation (Non-Medical):  Not on file   Physical Activity:     Days of Exercise per Week: Not on file    Minutes of Exercise per Session: Not on file   Stress:     Feeling of Stress : Not on file   Social Connections:     Frequency of Communication with Friends and Family: Not on file    Frequency of Social Gatherings with Friends and Family: Not on file    Attends Pentecostal Services: Not on file    Active Member of Clubs or Organizations: Not on file    Attends Club or Organization Meetings: Not on file    Marital Status: Not on file   Intimate Partner Violence:     Fear of Current or Ex-Partner: Not on file    Emotionally Abused: Not on file    Physically Abused: Not on file    Sexually Abused: Not on file   Housing Stability:     Unable to Pay for Housing in the Last Year: Not on file    Number of Places Lived in the Last Year: Not on file    Unstable Housing in the Last Year: Not on file       MEDICATIONS:    Current Facility-Administered Medications:     acetaminophen (TYLENOL) tablet 650 mg, 650 mg, Oral, Q4H PRN, Remberto Holland MD    magnesium hydroxide (MILK OF MAGNESIA) 400 MG/5ML suspension 30 mL, 30 mL, Oral, Daily PRN, Remberto Holland MD    aluminum & magnesium hydroxide-simethicone (MAALOX) 200-200-20 MG/5ML suspension 30 mL, 30 mL, Oral, PRN, Remberto Holland MD    haloperidol (HALDOL) tablet 5 mg, 5 mg, Oral, Q6H PRN **OR** haloperidol lactate (HALDOL) injection 5 mg, 5 mg, IntraMUSCular, Q6H PRN, Remberto Holland MD    benztropine mesylate (COGENTIN) injection 2 mg, 2 mg, IntraMUSCular, BID PRN, Remberto Holland MD    hydrOXYzine (VISTARIL) capsule 50 mg, 50 mg, Oral, Q6H PRN, 50 mg at 12/07/21 1306 **OR** hydrOXYzine (VISTARIL) injection 50 mg, 50 mg, IntraMUSCular, Q6H PRN, Remberto Holland MD    escitalopram (LEXAPRO) tablet 15 mg, 15 mg, Oral, Daily, Dion Panda MD, 15 mg at 12/08/21 7462    traZODone (DESYREL) tablet 50 mg, 50 mg, Oral, Nightly, Neville Adhikari MD, 50 mg at 12/07/21 2040    glucose (GLUTOSE) 40 % oral gel 15 g, 15 g, Oral, PRN, Joi Hollis, APRN - NP    dextrose 50 % IV solution, 12.5 g, IntraVENous, PRN, Joi Hollis, APRN - NP    glucagon (rDNA) injection 1 mg, 1 mg, IntraMUSCular, PRN, Joi Hollis, APRN - NP    dextrose 5 % solution, 100 mL/hr, IntraVENous, PRN, Joi Hollis, APRN - NP    Examination:  /67   Pulse 88   Temp 97.4 °F (36.3 °C) (Oral)   Resp 18   Ht 5' 3\" (1.6 m)   Wt 160 lb (72.6 kg)   LMP  (LMP Unknown)   SpO2 95%   Breastfeeding No   BMI 28.34 kg/m²   Gait - steady    HOSPITAL COURSE[de-identified]  Following admission to the hospital, patient had a complete physical exam and blood work up  Patient was monitored closely with suicide precaution  Patient was started on medication as listed below  Pt is worried about missing her appointment today  Was encouraged to participate in group and other milieu activity  Patient started to feel better with this combination of treatment. Significant progress in the symptoms since admission. Mood better, with the score of 2/10 - bad  No AVH or paranoid thoughts  No Hopeless or worthless feeling  No active SI/HI  Appetite:  [x] Normal  [] Increased  [] Decreased    Sleep:       [x] Normal  [] Fair       [] Poor            Energy:    [x] Normal  [] Increased  [] Decreased     SI [] Present  [x] Absent  HI  []Present  [x] Absent   Aggression:  [] yes  [] no  Patient is [x] able  [] unable to CONTRACT FOR SAFETY   Medication side effects(SE):  [x] None(Psych.  Meds.) [] Other      Mental Status Examination on discharge:    Level of consciousness:  within normal limits   Appearance:  well-appearing  Behavior/Motor:  no abnormalities noted  Attitude toward examiner:  attentive and good eye contact  Speech:  spontaneous, normal rate and normal volume   Mood: less depressed  Affect:  mood congruent  Thought processes:  linear   Thought content:  Suicidal Ideation:  denies suicidal ideation  Delusions:  no evidence of delusions  Perceptual Disturbance:  denies any perceptual disturbance  Cognition:  oriented to person, place, and time   Concentration intact  Memory intact  Insight good   Judgement fair   Fund of Knowledge adequate      ASSESSMENT:  Patient symptoms are:  [x] Well controlled  [x] Improving  [] Worsening  [] No change      Diagnosis:  Principal Problem:    MDD (major depressive disorder), recurrent severe, without psychosis ([facility] [address]  Active Problems:    Panic disorder  Resolved Problems:    * No resolved hospital problems. *      LABS:    Recent Labs     12/07/21 0412   WBC 12.6*   HGB 14.5        Recent Labs     12/07/21 0412      K 4.1      CO2 23   BUN 15   CREATININE 0.46*   GLUCOSE 149*     Recent Labs     12/07/21 0412   BILITOT <0.2   ALKPHOS 59   AST 14   ALT 16     Lab Results   Component Value Date    LABAMPH Neg 12/07/2021    BARBSCNU Neg 12/07/2021    LABBENZ Neg 12/07/2021    LABMETH Neg 12/07/2021    OPIATESCREENURINE Neg 12/07/2021    PHENCYCLIDINESCREENURINE Neg 12/07/2021    ETOH <10 12/07/2021     Lab Results   Component Value Date    TSH 1.560 12/07/2021     No results found for: LITHIUM  No results found for: VALPROATE, CBMZ    RISK ASSESSMENT AT DISCHARGE: Low risk for suicide and homicide. Treatment Plan:  Reviewed current Medications with the patient. Education provided on the complaince with treatment. Risks, benefits, side effects, drug-to-drug interactions and alternatives to treatment were discussed. Encourage patient to attend outpatient follow up appointment and therapy. Patient was advised to call the outpatient provider, visit the nearest ED or call 911 if symptoms are not manageable. Patient's family member was contacted prior to the discharge.          Medication List      START taking these medications    traZODone 50 MG tablet  Commonly known as: DESYREL  Take 1 tablet by mouth nightly        CHANGE how you take these medications    escitalopram 5 MG tablet  Commonly known as: LEXAPRO  Take 3 tablets by mouth daily  Start taking on: December 9, 2021  What changed:   · medication strength  · how much to take           Where to Get Your Medications      These medications were sent to [facility] [address] [address] [facility], [address] [place] [address] - P [phone] - F [phone] [address] [facility] [address]    Phone: [phone]   · escitalopram 5 MG tablet  · traZODone 50 MG tablet           Reason for more than one antipsychotic:   [x] N/A  [] 3 failed monotherapy(drugs tried):  [] Cross over to a new antipsychotic  [] Taper to monotherapy from polypharmacy  [] Augmentation of Clozapine therapy due to treatment resistance to single therapy        TIME SPEND - 35 MINUTES TO COMPLETE THE EVALUATION, DISCHARGE SUMMARY, MEDICATION RECONCILIATION AND FOLLOW UP CARE     Signed:  Alicia Rodrigues MD  12/8/2021  10:56 AM

## 2023-07-14 PROBLEM — M20.11 HALLUX VALGUS WITH BUNIONS OF RIGHT FOOT: Status: ACTIVE | Noted: 2023-07-14

## 2023-07-14 PROBLEM — M21.611 HALLUX VALGUS WITH BUNIONS OF RIGHT FOOT: Status: ACTIVE | Noted: 2023-07-14

## 2023-07-14 PROBLEM — B96.89 BACTERIAL VAGINITIS: Status: ACTIVE | Noted: 2023-07-14

## 2023-07-14 PROBLEM — B00.89 HERPES DERMATITIS: Status: ACTIVE | Noted: 2023-07-14

## 2023-07-14 PROBLEM — J02.9 SORE THROAT: Status: ACTIVE | Noted: 2023-07-14

## 2023-07-14 PROBLEM — N92.0 MENORRHAGIA: Status: ACTIVE | Noted: 2023-07-14

## 2023-07-14 PROBLEM — L30.1 DYSHIDROSIS: Status: ACTIVE | Noted: 2023-07-14

## 2023-07-14 PROBLEM — L57.0 ACTINIC KERATOSIS: Status: ACTIVE | Noted: 2023-07-14

## 2023-07-14 PROBLEM — N95.0 POSTMENOPAUSAL BLEEDING: Status: ACTIVE | Noted: 2023-07-14

## 2023-07-14 PROBLEM — I78.1 TELANGIECTASIA: Status: ACTIVE | Noted: 2023-07-14

## 2023-07-14 PROBLEM — R87.810 ASCUS WITH POSITIVE HIGH RISK HPV CERVICAL: Status: ACTIVE | Noted: 2023-07-14

## 2023-07-14 PROBLEM — E55.9 VITAMIN D DEFICIENCY: Status: ACTIVE | Noted: 2023-07-14

## 2023-07-14 PROBLEM — M25.50 ARTHRALGIA: Status: ACTIVE | Noted: 2023-07-14

## 2023-07-14 PROBLEM — M77.40 METATARSALGIA: Status: ACTIVE | Noted: 2023-07-14

## 2023-07-14 PROBLEM — R19.5 POSITIVE COLORECTAL CANCER SCREENING USING COLOGUARD TEST: Status: ACTIVE | Noted: 2023-07-14

## 2023-07-14 PROBLEM — R10.13 DYSPEPSIA: Status: ACTIVE | Noted: 2023-07-14

## 2023-07-14 PROBLEM — L98.9 CHANGING SKIN LESION: Status: ACTIVE | Noted: 2023-07-14

## 2023-07-14 PROBLEM — M54.16 LUMBAR RADICULAR PAIN: Status: ACTIVE | Noted: 2023-07-14

## 2023-07-14 PROBLEM — B35.3 TINEA PEDIS: Status: ACTIVE | Noted: 2023-07-14

## 2023-07-14 PROBLEM — R87.610 ASCUS WITH POSITIVE HIGH RISK HPV CERVICAL: Status: ACTIVE | Noted: 2023-07-14

## 2023-07-14 PROBLEM — E11.9 TYPE 2 DIABETES MELLITUS (MULTI): Status: ACTIVE | Noted: 2023-07-14

## 2023-07-14 PROBLEM — M70.71 BURSITIS OF HIP, RIGHT: Status: ACTIVE | Noted: 2023-07-14

## 2023-07-14 PROBLEM — M54.50 LOW BACK PAIN: Status: ACTIVE | Noted: 2023-07-14

## 2023-07-14 PROBLEM — L30.9 ECZEMA: Status: ACTIVE | Noted: 2023-07-14

## 2023-07-14 PROBLEM — M75.80 ROTATOR CUFF TENDONITIS: Status: ACTIVE | Noted: 2023-07-14

## 2023-07-14 PROBLEM — N76.0 BACTERIAL VAGINITIS: Status: ACTIVE | Noted: 2023-07-14

## 2023-07-14 PROBLEM — F32.A DEPRESSION: Status: ACTIVE | Noted: 2023-07-14

## 2023-07-14 PROBLEM — S39.012A LUMBAR STRAIN: Status: ACTIVE | Noted: 2023-07-14

## 2023-07-14 PROBLEM — G47.00 INSOMNIA: Status: ACTIVE | Noted: 2023-07-14
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
## 2023-07-14 PROBLEM — M62.830 LUMBAR PARASPINAL MUSCLE SPASM: Status: ACTIVE | Noted: 2023-07-14

## 2023-08-07 ENCOUNTER — OFFICE VISIT (OUTPATIENT)
Dept: PRIMARY CARE | Facility: CLINIC | Age: 53
End: 2023-08-07
Payer: MEDICARE

## 2023-08-07 VITALS
SYSTOLIC BLOOD PRESSURE: 126 MMHG | RESPIRATION RATE: 16 BRPM | DIASTOLIC BLOOD PRESSURE: 78 MMHG | BODY MASS INDEX: 33.31 KG/M2 | HEART RATE: 82 BPM | WEIGHT: 188 LBS | TEMPERATURE: 97 F | HEIGHT: 63 IN

## 2023-08-07 DIAGNOSIS — F41.1 GENERALIZED ANXIETY DISORDER: ICD-10-CM

## 2023-08-07 DIAGNOSIS — F33.2 MDD (MAJOR DEPRESSIVE DISORDER), RECURRENT SEVERE, WITHOUT PSYCHOSIS (MULTI): ICD-10-CM

## 2023-08-07 DIAGNOSIS — B35.1 ONYCHOMYCOSIS: ICD-10-CM

## 2023-08-07 DIAGNOSIS — F51.04 CHRONIC INSOMNIA: Primary | ICD-10-CM

## 2023-08-07 DIAGNOSIS — E11.9 TYPE 2 DIABETES MELLITUS WITHOUT COMPLICATION, WITHOUT LONG-TERM CURRENT USE OF INSULIN (MULTI): ICD-10-CM

## 2023-08-07 PROBLEM — M62.830 LUMBAR PARASPINAL MUSCLE SPASM: Status: RESOLVED | Noted: 2023-07-14 | Resolved: 2023-08-07

## 2023-08-07 PROBLEM — R19.5 POSITIVE COLORECTAL CANCER SCREENING USING COLOGUARD TEST: Status: RESOLVED | Noted: 2023-07-14 | Resolved: 2023-08-07

## 2023-08-07 PROBLEM — M54.50 LOW BACK PAIN: Status: RESOLVED | Noted: 2023-07-14 | Resolved: 2023-08-07

## 2023-08-07 PROBLEM — B96.89 BACTERIAL VAGINITIS: Status: RESOLVED | Noted: 2023-07-14 | Resolved: 2023-08-07

## 2023-08-07 PROBLEM — J02.9 SORE THROAT: Status: RESOLVED | Noted: 2023-07-14 | Resolved: 2023-08-07

## 2023-08-07 PROBLEM — N76.0 BACTERIAL VAGINITIS: Status: RESOLVED | Noted: 2023-07-14 | Resolved: 2023-08-07

## 2023-08-07 PROBLEM — M54.16 LUMBAR RADICULAR PAIN: Status: RESOLVED | Noted: 2023-07-14 | Resolved: 2023-08-07

## 2023-08-07 PROBLEM — S39.012A LUMBAR STRAIN: Status: RESOLVED | Noted: 2023-07-14 | Resolved: 2023-08-07

## 2023-08-07 PROBLEM — G47.00 INSOMNIA: Status: RESOLVED | Noted: 2023-07-14 | Resolved: 2023-08-07

## 2023-08-07 LAB — POC HEMOGLOBIN A1C: 6.6 % (ref 4.2–6.5)

## 2023-08-07 PROCEDURE — 99214 OFFICE O/P EST MOD 30 MIN: CPT | Performed by: FAMILY MEDICINE

## 2023-08-07 PROCEDURE — 3078F DIAST BP <80 MM HG: CPT | Performed by: FAMILY MEDICINE

## 2023-08-07 PROCEDURE — 83036 HEMOGLOBIN GLYCOSYLATED A1C: CPT | Performed by: FAMILY MEDICINE

## 2023-08-07 PROCEDURE — 3074F SYST BP LT 130 MM HG: CPT | Performed by: FAMILY MEDICINE

## 2023-08-07 PROCEDURE — 1036F TOBACCO NON-USER: CPT | Performed by: FAMILY MEDICINE

## 2023-08-07 RX ORDER — METFORMIN HYDROCHLORIDE 500 MG/1
500 TABLET ORAL DAILY
Qty: 90 TABLET | Refills: 1 | Status: SHIPPED | OUTPATIENT
Start: 2023-08-07 | End: 2023-09-05

## 2023-08-07 RX ORDER — TRAZODONE HYDROCHLORIDE 50 MG/1
50 TABLET ORAL NIGHTLY PRN
Qty: 90 TABLET | Refills: 1 | Status: SHIPPED | OUTPATIENT
Start: 2023-08-07 | End: 2023-09-05

## 2023-08-07 RX ORDER — CICLOPIROX 80 MG/ML
1 SOLUTION TOPICAL NIGHTLY
Qty: 6.6 ML | Refills: 3 | Status: SHIPPED | OUTPATIENT
Start: 2023-08-07 | End: 2024-02-08 | Stop reason: SDUPTHER

## 2023-08-07 RX ORDER — ESCITALOPRAM OXALATE 20 MG/1
20 TABLET ORAL DAILY
Qty: 90 TABLET | Refills: 1 | Status: SHIPPED | OUTPATIENT
Start: 2023-08-07 | End: 2023-09-05

## 2023-08-07 NOTE — PROGRESS NOTES
Rosanna Ling is a 53 y.o. female here today for   Chief Complaint   Patient presents with    Diabetes    Insomnia    Depression           Diabetes recheck -- Patient feeling well.  No CP, numbness of feet, blurred vision, polyuria.  Trying to follow diet.  No side effects from medications.  No hypoglycemic symptoms.   A1C is 6.6.      Mood disorder recheck -- Patient feels like condition is well controlled.  Has good control of mood and emotional reactions.  No SE's or problems with medications.  No homicidal or suicidal ideation.  Patient wishes to continue same medications.   No breakthrough anxiety or depression.      Insomnia - Trazodone nightly and it works well.  About 5 times per week.      Right first toenail with fungus for years.  It has slowly gotten worse and is now painful especially if she is wearing shoes.      Current Outpatient Medications:     ciclopirox (Penlac) 8 % solution, Apply 1 Application topically once daily at bedtime., Disp: 6.6 mL, Rfl: 3    escitalopram (Lexapro) 20 mg tablet, Take 1 tablet (20 mg) by mouth once daily., Disp: 90 tablet, Rfl: 1    metFORMIN (Glucophage) 500 mg tablet, Take 1 tablet (500 mg) by mouth once daily., Disp: 90 tablet, Rfl: 1    traZODone (Desyrel) 50 mg tablet, Take 1 tablet (50 mg) by mouth as needed at bedtime for sleep., Disp: 90 tablet, Rfl: 1    Patient Active Problem List   Diagnosis    Actinic keratosis    Arthralgia    ASCUS with positive high risk HPV cervical    Bursitis of hip, right    Changing skin lesion    Dyspepsia    Dyshidrosis    Eczema    Herpes dermatitis    Hallux valgus with bunions of right foot    Chronic insomnia    Menorrhagia    Metatarsalgia    Postmenopausal bleeding    Rotator cuff tendonitis    Tanning bed exposure    Telangiectasia    Tinea pedis    Type 2 diabetes mellitus (CMS/HCC)    Vitamin D deficiency    Generalized anxiety disorder    MDD (major depressive disorder), recurrent severe, without psychosis (CMS/HCC)     Onychomycosis         Recent Results (from the past 672 hour(s))   CYTOLOGY GYN RESULTS    Collection Time: 07/21/23 11:14 AM   Result Value Ref Range    Pathology Report           Accession #: [id number]     Date of Procedure:  7/21/2023       Pathologist: [facility], Cytology  Date Reported: 7/28/2023  Date Received:  7/24/2023  Submitting Physician: SALLY QUIROGA DO                    FINAL CYTOLOGICAL INTERPRETATION        A.  THINPREP PAP CERVICAL:              Specimen adequacy:       SATISFACTORY FOR EVALUATION.       Quality Indicator: Endocervical/transformation zone component is present.              General Categorization:       NEGATIVE FOR INTRAEPITHELIAL LESION OR MALIGNANCY.                            HIGH RISK HPV TEST RESULT:                       HPV GENOTYPE  16                      NEGATIVE       HPV GENOTYPE  18                      NEGATIVE       HPV GENOTYPE  OTHER             NEGATIVE              Reference Range: Negative                  QC review performed at [facility], [address] [place] [id number]    Testing for high-risk (HR) type of human papilloma virus (HPV) is performed  by  the Roche george HPV Test.  The george HPV Test is a qualitative polymerase chain  reaction that amplifies DNA of HPV16, HPV18 and 12 other high-risk HPV types  (31, 33, 35, 39, 45, 51, 52, 56, 58, 59, 66, and 68) associated with cervical  cancer and its precursor lesions.  A positive result indicates the presence of  HPV DNA due to one or more of the 14 genotypes: 16, 18, 31, 33, 35, 39, 45, 51,  52, 56, 58, 59, 66, and 68. Negative results indicate HPV DNA concentrations  are undetectable or below the pre-set threshold for detection. False negative  results may be associated with unoptimized sampling. A negative HR HPV result  does not exclude the possibility of future cytologic HSIL or underlying CIN2-3  or cancer.    This test is approved for cervical  specimens by  the [place] Food and Drug  Administration. Results of this test should be interpreted in conjunction with  the patient’s Pap test results.  Please refer to ASCCP current guidelines for  the use of HPV DNA testing, result in terpretation, and patient management.   The performance of this test was verified by the Molecular Diagnostic  Laboratory at [facility]. The lab is  certified under the Clinical Laboratory Amendments of 1988 (CLIA 88) as  qualified to perform high complexity clinical laboratory testing.      Electronically Signed Out By [facility],  Cytology//ZAMUDIO/LSM   By the signature on this report, the individual or group listed as making the  Final Interpretation/Diagnosis certifies that they have reviewed this case.  Diagnostic interpretation performed at [facility] [address] [place] [address]  Educational Note:  Cervical cytology is a screening procedure primarily for squamous cancers and  precursors and has associated false-negative and false-positive results as  evidenced by published data.  Your patient’s test should be interpreted in this  context, together with patient’s history and clinical findings.  Regular  sa mpling and follow-up of unexplained clinical signs and symptoms are  recommended to minimize false negative results.         Clinical History  Date of Last Menstrual Period:     10/1/2022    Other Clinical Conditions:  HPV Test for All Interpretations - Include HPV Genotype    Clinical Diagnosis History: Well woman exam with routine gynecological exam -  (Z01.419)   Source of Specimen  A: THINPREP PAP CERVICAL            [facility]  Department of Pathology   [id number] [place] [id number]       POCT glycosylated hemoglobin (Hb A1C) manually resulted    Collection Time: 08/07/23  3:24 PM   Result Value Ref Range    POC HEMOGLOBIN A1c 6.6 (A) 4.2 - 6.5 %     "    Objective    Visit Vitals  /78   Pulse 82   Temp 36.1 °C (97 °F)   Resp 16   Ht 1.6 m (5' 3\")   Wt 85.3 kg (188 lb)   BMI 33.30 kg/m²     Body mass index is 33.3 kg/m².     Physical Exam   General - Not in acute distress and cooperative.  Build & Nutrition - Well developed  Posture - Normal  Gait - Normal  Mental Status - alert and oriented x 3    Head - Normocephalic    Neck - Thyroid normal size    Eyes - Bilateral - Sclera clear and lids pink without edema or mass.      Skin - Warm and dry with no rashes on visible skin    Lungs - Clear to auscultation and normal breathing effort    Cardiovascular - RRR and no murmurs, rubs or thrill.    Peripheral Vascular - Bilateral - no edema present    Neuropsychiatric - normal mood and affect    Right foot-patient's first toenail is yellowed and thickened and pushing on it causes some discomfort.    Assessment    1. Chronic insomnia  traZODone (Desyrel) 50 mg tablet   Condition well controlled.  No change in current treatment regimen.  Refill given of current medication.  Make a follow up appointment with me for recheck in 6 months.     2. Type 2 diabetes mellitus without complication, without long-term current use of insulin (CMS/[facility])  POCT glycosylated hemoglobin (Hb A1C) manually resulted, metFORMIN (Glucophage) 500 mg tablet   Condition well controlled.  No change in current treatment regimen.  Refill given of current medication.  Appropriate labs ordered or reviewed.  Make a follow up appointment with me for recheck in 6 months.     3. MDD (major depressive disorder), recurrent severe, without psychosis (CMS/HCC)  escitalopram (Lexapro) 20 mg tablet   Condition well controlled.  No change in current treatment regimen.  Refill given of current medication.  Make a follow up appointment with me for recheck in 6 months.     4. Onychomycosis  ciclopirox (Penlac) 8 % solution   We will treat this with ciclopirox 8% solution applied daily.  I gave her detailed " instructions on how to apply this and to scrub it off once weekly with an alcohol pad and then continue.  It may take up to 6 months for this to completely clear.  I recommend to call us and make a follow up appointment if sxs worsen or do not resolve.     5. Generalized anxiety disorder     Condition well controlled.  No change in current treatment regimen.  Refill given of current medication.  Make a follow up appointment with me for recheck in 6 months.

## 2023-09-05 DIAGNOSIS — F33.2 MDD (MAJOR DEPRESSIVE DISORDER), RECURRENT SEVERE, WITHOUT PSYCHOSIS (MULTI): ICD-10-CM

## 2023-09-05 DIAGNOSIS — F51.04 CHRONIC INSOMNIA: ICD-10-CM

## 2023-09-05 DIAGNOSIS — E11.9 TYPE 2 DIABETES MELLITUS WITHOUT COMPLICATION, WITHOUT LONG-TERM CURRENT USE OF INSULIN (MULTI): ICD-10-CM

## 2023-09-05 RX ORDER — METFORMIN HYDROCHLORIDE 500 MG/1
500 TABLET ORAL DAILY
Qty: 30 TABLET | Refills: 6 | Status: SHIPPED | OUTPATIENT
Start: 2023-09-05 | End: 2024-02-08 | Stop reason: SDUPTHER

## 2023-09-05 RX ORDER — ESCITALOPRAM OXALATE 20 MG/1
20 TABLET ORAL DAILY
Qty: 30 TABLET | Refills: 6 | Status: SHIPPED | OUTPATIENT
Start: 2023-09-05 | End: 2024-02-08 | Stop reason: SDUPTHER

## 2023-09-05 RX ORDER — TRAZODONE HYDROCHLORIDE 50 MG/1
50 TABLET ORAL NIGHTLY PRN
Qty: 30 TABLET | Refills: 6 | Status: SHIPPED | OUTPATIENT
Start: 2023-09-05 | End: 2024-02-08 | Stop reason: SDUPTHER

## 2024-02-08 ENCOUNTER — APPOINTMENT (OUTPATIENT)
Dept: PRIMARY CARE | Facility: CLINIC | Age: 54
End: 2024-02-08
Payer: COMMERCIAL

## 2024-02-08 ENCOUNTER — OFFICE VISIT (OUTPATIENT)
Dept: PRIMARY CARE | Facility: CLINIC | Age: 54
End: 2024-02-08
Payer: COMMERCIAL

## 2024-02-08 VITALS
DIASTOLIC BLOOD PRESSURE: 68 MMHG | BODY MASS INDEX: 32.43 KG/M2 | HEART RATE: 62 BPM | HEIGHT: 63 IN | SYSTOLIC BLOOD PRESSURE: 122 MMHG | TEMPERATURE: 98.1 F | WEIGHT: 183 LBS | RESPIRATION RATE: 16 BRPM

## 2024-02-08 DIAGNOSIS — B00.89 HERPES DERMATITIS: Primary | ICD-10-CM

## 2024-02-08 DIAGNOSIS — F51.04 CHRONIC INSOMNIA: ICD-10-CM

## 2024-02-08 DIAGNOSIS — E11.9 TYPE 2 DIABETES MELLITUS WITHOUT COMPLICATION, WITHOUT LONG-TERM CURRENT USE OF INSULIN (MULTI): ICD-10-CM

## 2024-02-08 DIAGNOSIS — F33.2 MDD (MAJOR DEPRESSIVE DISORDER), RECURRENT SEVERE, WITHOUT PSYCHOSIS (MULTI): ICD-10-CM

## 2024-02-08 DIAGNOSIS — B35.1 ONYCHOMYCOSIS: ICD-10-CM

## 2024-02-08 LAB — POC HEMOGLOBIN A1C: 6.1 % (ref 4.2–6.5)

## 2024-02-08 PROCEDURE — 99214 OFFICE O/P EST MOD 30 MIN: CPT | Performed by: FAMILY MEDICINE

## 2024-02-08 PROCEDURE — 83036 HEMOGLOBIN GLYCOSYLATED A1C: CPT | Performed by: FAMILY MEDICINE

## 2024-02-08 PROCEDURE — 3074F SYST BP LT 130 MM HG: CPT | Performed by: FAMILY MEDICINE

## 2024-02-08 PROCEDURE — 1036F TOBACCO NON-USER: CPT | Performed by: FAMILY MEDICINE

## 2024-02-08 PROCEDURE — 3078F DIAST BP <80 MM HG: CPT | Performed by: FAMILY MEDICINE

## 2024-02-08 RX ORDER — VALACYCLOVIR HYDROCHLORIDE 1 G/1
1000 TABLET, FILM COATED ORAL 2 TIMES DAILY
Qty: 30 TABLET | Refills: 1 | Status: SHIPPED | OUTPATIENT
Start: 2024-02-08

## 2024-02-08 RX ORDER — TRAZODONE HYDROCHLORIDE 50 MG/1
50 TABLET ORAL NIGHTLY PRN
Qty: 90 TABLET | Refills: 1 | Status: SHIPPED | OUTPATIENT
Start: 2024-02-08

## 2024-02-08 RX ORDER — ESCITALOPRAM OXALATE 20 MG/1
20 TABLET ORAL DAILY
Qty: 90 TABLET | Refills: 1 | Status: SHIPPED | OUTPATIENT
Start: 2024-02-08

## 2024-02-08 RX ORDER — CICLOPIROX 80 MG/ML
1 SOLUTION TOPICAL NIGHTLY
Qty: 6.6 ML | Refills: 3 | Status: SHIPPED | OUTPATIENT
Start: 2024-02-08

## 2024-02-08 RX ORDER — METFORMIN HYDROCHLORIDE 500 MG/1
500 TABLET ORAL DAILY
Qty: 90 TABLET | Refills: 1 | Status: SHIPPED | OUTPATIENT
Start: 2024-02-08

## 2024-02-08 NOTE — PROGRESS NOTES
Rosanna Ling is a 53 y.o. female here today for   Chief Complaint   Patient presents with    Diabetes    Insomnia    Depression        Diabetes recheck -- Patient feeling well.  No CP, numbness of feet, blurred vision, polyuria.  Trying to follow diet.  No side effects from medications.  No hypoglycemic symptoms.   Her A1c is 6.1 today in the office.    Insomnia - sleeping well with Trazodone.      Mood disorder recheck -- Patient feels like condition is well controlled.  Has good control of mood and emotional reactions.  No SE's or problems with medications.  No homicidal or suicidal ideation.  Patient wishes to continue same medications.      Onychomycosis --she needs a refill of ciclopirox.  Her toenails are improving.    Herpes dermatitis --she needs a refill of Valtrex for rare as needed use for outbreaks.    Current Outpatient Medications:     ciclopirox (Penlac) 8 % solution, Apply 1 Application topically once daily at bedtime., Disp: 6.6 mL, Rfl: 3    escitalopram (Lexapro) 20 mg tablet, Take 1 tablet (20 mg) by mouth once daily., Disp: 90 tablet, Rfl: 1    metFORMIN (Glucophage) 500 mg tablet, Take 1 tablet (500 mg) by mouth once daily., Disp: 90 tablet, Rfl: 1    traZODone (Desyrel) 50 mg tablet, Take 1 tablet (50 mg) by mouth as needed at bedtime for sleep., Disp: 90 tablet, Rfl: 1    valACYclovir (Valtrex) 1 gram tablet, Take 1 tablet (1,000 mg) by mouth 2 times a day. As needed., Disp: 30 tablet, Rfl: 1    Patient Active Problem List   Diagnosis    Actinic keratosis    Arthralgia    ASCUS with positive high risk HPV cervical    Bursitis of hip, right    Changing skin lesion    Dyspepsia    Dyshidrosis    Eczema    Herpes dermatitis    Hallux valgus with bunions of right foot    Chronic insomnia    Menorrhagia    Metatarsalgia    Postmenopausal bleeding    Rotator cuff tendonitis    Tanning bed exposure    Telangiectasia    Tinea pedis    Type 2 diabetes mellitus (CMS/HCC)    Vitamin D deficiency     "Generalized anxiety disorder    MDD (major depressive disorder), recurrent severe, without psychosis (CMS/HCC)    Onychomycosis         Recent Results (from the past 672 hour(s))   POCT glycosylated hemoglobin (Hb A1C) manually resulted    Collection Time: 02/08/24  3:28 PM   Result Value Ref Range    POC HEMOGLOBIN A1c 6.1 4.2 - 6.5 %        Objective    Visit Vitals    Visit Vitals  /68   Pulse 62   Temp 36.7 °C (98.1 °F)   Resp 16   Ht 1.6 m (5' 3\")   Wt 83 kg (183 lb)   BMI 32.42 kg/m²   Smoking Status Former   BSA 1.92 m²       Body mass index is 32.42 kg/m².     Physical Exam   General - Not in acute distress and cooperative.  Build & Nutrition - Well developed  Posture - Normal  Gait - Normal  Mental Status - alert and oriented x 3    Head - Normocephalic    Neck - Thyroid normal size    Eyes - Bilateral - Sclera clear and lids pink without edema or mass.      Skin - Warm and dry with no rashes on visible skin    Lungs - Clear to auscultation and normal breathing effort    Cardiovascular - RRR and no murmurs, rubs or thrill.    Peripheral Vascular - Bilateral - no edema present    Neuropsychiatric - normal mood and affect        Assessment    1. Herpes dermatitis  valACYclovir (Valtrex) 1 gram tablet   Condition well controlled.  No change in current treatment regimen.  Refill given of current medication.  Make a follow up appointment with me for recheck in 6 months.       2. Onychomycosis  ciclopirox (Penlac) 8 % solution   Condition well controlled.  No change in current treatment regimen.  Refill given of current medication.  Make a follow up appointment with me for recheck in 6 months.       3. MDD (major depressive disorder), recurrent severe, without psychosis (CMS/HCC)  escitalopram (Lexapro) 20 mg tablet   Condition well controlled.  No change in current treatment regimen.  Refill given of current medication.  Make a follow up appointment with me for recheck in 6 months.       4. Type 2 diabetes " mellitus without complication, without long-term current use of insulin (CMS/[facility])  POCT glycosylated hemoglobin (Hb A1C) manually resulted, metFORMIN (Glucophage) 500 mg tablet, Comprehensive Metabolic Panel, CBC, Lipid Panel, valACYclovir (Valtrex) 1 gram tablet   Condition well controlled.  No change in current treatment regimen.  Refill given of current medication.  Appropriate labs ordered or reviewed.  Make a follow up appointment with me for recheck in 6 months.      5. Chronic insomnia  traZODone (Desyrel) 50 mg tablet   Condition well controlled.  No change in current treatment regimen.  Refill given of current medication.  Make a follow up appointment with me for recheck in 6 months.

## 2024-02-09 ENCOUNTER — LAB (OUTPATIENT)
Dept: LAB | Facility: LAB | Age: 54
End: 2024-02-09
Payer: COMMERCIAL

## 2024-02-09 DIAGNOSIS — E11.9 TYPE 2 DIABETES MELLITUS WITHOUT COMPLICATION, WITHOUT LONG-TERM CURRENT USE OF INSULIN (MULTI): ICD-10-CM

## 2024-02-09 LAB
ALBUMIN SERPL BCP-MCNC: 4.6 G/DL (ref 3.4–5)
ALP SERPL-CCNC: 41 U/L (ref 33–110)
ALT SERPL W P-5'-P-CCNC: 23 U/L (ref 7–45)
ANION GAP SERPL CALC-SCNC: 11 MMOL/L (ref 10–20)
AST SERPL W P-5'-P-CCNC: 17 U/L (ref 9–39)
BILIRUB SERPL-MCNC: 0.4 MG/DL (ref 0–1.2)
BUN SERPL-MCNC: 14 MG/DL (ref 6–23)
CALCIUM SERPL-MCNC: 9.8 MG/DL (ref 8.6–10.3)
CHLORIDE SERPL-SCNC: 108 MMOL/L (ref 98–107)
CHOLEST SERPL-MCNC: 175 MG/DL (ref 0–199)
CHOLESTEROL/HDL RATIO: 3.8
CO2 SERPL-SCNC: 26 MMOL/L (ref 21–32)
CREAT SERPL-MCNC: 0.7 MG/DL (ref 0.5–1.05)
EGFRCR SERPLBLD CKD-EPI 2021: >90 ML/MIN/1.73M*2
ERYTHROCYTE [DISTWIDTH] IN BLOOD BY AUTOMATED COUNT: 12.7 % (ref 11.5–14.5)
GLUCOSE SERPL-MCNC: 119 MG/DL (ref 74–99)
HCT VFR BLD AUTO: 45.8 % (ref 36–46)
HDLC SERPL-MCNC: 45.6 MG/DL
HGB BLD-MCNC: 15.4 G/DL (ref 12–16)
LDLC SERPL CALC-MCNC: 97 MG/DL
MCH RBC QN AUTO: 30.1 PG (ref 26–34)
MCHC RBC AUTO-ENTMCNC: 33.6 G/DL (ref 32–36)
MCV RBC AUTO: 90 FL (ref 80–100)
NON HDL CHOLESTEROL: 129 MG/DL (ref 0–149)
NRBC BLD-RTO: 0 /100 WBCS (ref 0–0)
PLATELET # BLD AUTO: 265 X10*3/UL (ref 150–450)
POTASSIUM SERPL-SCNC: 4.4 MMOL/L (ref 3.5–5.3)
PROT SERPL-MCNC: 7.1 G/DL (ref 6.4–8.2)
RBC # BLD AUTO: 5.12 X10*6/UL (ref 4–5.2)
SODIUM SERPL-SCNC: 141 MMOL/L (ref 136–145)
TRIGL SERPL-MCNC: 160 MG/DL (ref 0–149)
VLDL: 32 MG/DL (ref 0–40)
WBC # BLD AUTO: 7.2 X10*3/UL (ref 4.4–11.3)

## 2024-02-09 PROCEDURE — 80053 COMPREHEN METABOLIC PANEL: CPT

## 2024-02-09 PROCEDURE — 80061 LIPID PANEL: CPT

## 2024-02-09 PROCEDURE — 85027 COMPLETE CBC AUTOMATED: CPT

## 2024-02-09 PROCEDURE — 36415 COLL VENOUS BLD VENIPUNCTURE: CPT

## 2024-03-06 DIAGNOSIS — E11.9 TYPE 2 DIABETES MELLITUS WITHOUT COMPLICATION, WITHOUT LONG-TERM CURRENT USE OF INSULIN (MULTI): ICD-10-CM

## 2024-03-06 DIAGNOSIS — F33.2 MDD (MAJOR DEPRESSIVE DISORDER), RECURRENT SEVERE, WITHOUT PSYCHOSIS (MULTI): ICD-10-CM

## 2024-03-07 RX ORDER — ESCITALOPRAM OXALATE 20 MG/1
20 TABLET ORAL DAILY
Qty: 30 TABLET | Refills: 6 | OUTPATIENT
Start: 2024-03-07

## 2024-03-07 RX ORDER — METFORMIN HYDROCHLORIDE 500 MG/1
500 TABLET ORAL DAILY
Qty: 30 TABLET | Refills: 6 | OUTPATIENT
Start: 2024-03-07

## 2024-07-26 ENCOUNTER — APPOINTMENT (OUTPATIENT)
Dept: OBSTETRICS AND GYNECOLOGY | Facility: CLINIC | Age: 54
End: 2024-07-26
Payer: COMMERCIAL

## 2024-08-08 ENCOUNTER — APPOINTMENT (OUTPATIENT)
Dept: PRIMARY CARE | Facility: CLINIC | Age: 54
End: 2024-08-08
Payer: COMMERCIAL

## 2024-08-08 VITALS
HEIGHT: 63 IN | RESPIRATION RATE: 16 BRPM | DIASTOLIC BLOOD PRESSURE: 90 MMHG | WEIGHT: 195 LBS | BODY MASS INDEX: 34.55 KG/M2 | HEART RATE: 85 BPM | TEMPERATURE: 97.9 F | SYSTOLIC BLOOD PRESSURE: 150 MMHG

## 2024-08-08 DIAGNOSIS — E11.9 TYPE 2 DIABETES MELLITUS WITHOUT COMPLICATION, WITHOUT LONG-TERM CURRENT USE OF INSULIN (MULTI): ICD-10-CM

## 2024-08-08 DIAGNOSIS — L30.1 DYSHIDROSIS: ICD-10-CM

## 2024-08-08 DIAGNOSIS — B00.89 HERPES DERMATITIS: ICD-10-CM

## 2024-08-08 DIAGNOSIS — F51.04 CHRONIC INSOMNIA: ICD-10-CM

## 2024-08-08 DIAGNOSIS — F41.1 GENERALIZED ANXIETY DISORDER: Primary | ICD-10-CM

## 2024-08-08 DIAGNOSIS — F33.2 MDD (MAJOR DEPRESSIVE DISORDER), RECURRENT SEVERE, WITHOUT PSYCHOSIS (MULTI): ICD-10-CM

## 2024-08-08 LAB — POC HEMOGLOBIN A1C: 8.2 % (ref 4.2–6.5)

## 2024-08-08 PROCEDURE — 3008F BODY MASS INDEX DOCD: CPT | Performed by: FAMILY MEDICINE

## 2024-08-08 PROCEDURE — 3048F LDL-C <100 MG/DL: CPT | Performed by: FAMILY MEDICINE

## 2024-08-08 PROCEDURE — 1036F TOBACCO NON-USER: CPT | Performed by: FAMILY MEDICINE

## 2024-08-08 PROCEDURE — 99213 OFFICE O/P EST LOW 20 MIN: CPT | Performed by: FAMILY MEDICINE

## 2024-08-08 PROCEDURE — 3077F SYST BP >= 140 MM HG: CPT | Performed by: FAMILY MEDICINE

## 2024-08-08 PROCEDURE — 83036 HEMOGLOBIN GLYCOSYLATED A1C: CPT | Performed by: FAMILY MEDICINE

## 2024-08-08 PROCEDURE — 3080F DIAST BP >= 90 MM HG: CPT | Performed by: FAMILY MEDICINE

## 2024-08-08 RX ORDER — VALACYCLOVIR HYDROCHLORIDE 1 G/1
1000 TABLET, FILM COATED ORAL 2 TIMES DAILY
Qty: 90 TABLET | Refills: 1 | Status: SHIPPED | OUTPATIENT
Start: 2024-08-08

## 2024-08-08 RX ORDER — AMMONIUM LACTATE 12 G/100G
LOTION TOPICAL
Qty: 225 G | Refills: 6 | Status: SHIPPED | OUTPATIENT
Start: 2024-08-08

## 2024-08-08 RX ORDER — TRAZODONE HYDROCHLORIDE 50 MG/1
50 TABLET ORAL NIGHTLY PRN
Qty: 90 TABLET | Refills: 1 | Status: SHIPPED | OUTPATIENT
Start: 2024-08-08

## 2024-08-08 RX ORDER — METFORMIN HYDROCHLORIDE 500 MG/1
500 TABLET ORAL
Qty: 180 TABLET | Refills: 1 | Status: SHIPPED | OUTPATIENT
Start: 2024-08-08

## 2024-08-08 RX ORDER — ESCITALOPRAM OXALATE 20 MG/1
20 TABLET ORAL DAILY
Qty: 90 TABLET | Refills: 1 | Status: SHIPPED | OUTPATIENT
Start: 2024-08-08

## 2024-08-08 NOTE — PROGRESS NOTES
Rosanna Ling is a 54 y.o. female here today for   Chief Complaint   Patient presents with    Depression    Diabetes    Insomnia    herpes dermatitis        HPI     Diabetes recheck -- Patient feeling well.  No CP, numbness of feet, blurred vision, polyuria.  Trying to follow diet.  No side effects from medications.  No hypoglycemic symptoms.   Her A1c is much worse today at 8.3.  She says her diet has not been as good over the summer and she has been stress eating a lot more.  She has gained about 12 pounds since her last visit 6 months ago.  She is taking the metformin correctly.     Insomnia - stable.  Uses Trazodone about 4 times per week.      Mood disorder recheck -- Patient feels like condition is well controlled.  Has good control of mood and emotional reactions.  No SE's or problems with medications.  No homicidal or suicidal ideation.  Patient wishes to continue same medications.      Very rare valtrex use for herpes dermatitis of right leg.        Current Outpatient Medications:     ammonium lactate (Lac-Hydrin) 12 % lotion, Apply 2 times daily as needed, Disp: 225 g, Rfl: 6    ciclopirox (Penlac) 8 % solution, Apply 1 Application topically once daily at bedtime., Disp: 6.6 mL, Rfl: 3    escitalopram (Lexapro) 20 mg tablet, Take 1 tablet (20 mg) by mouth once daily., Disp: 90 tablet, Rfl: 1    metFORMIN (Glucophage) 500 mg tablet, Take 1 tablet (500 mg) by mouth 2 times daily (morning and late afternoon)., Disp: 180 tablet, Rfl: 1    traZODone (Desyrel) 50 mg tablet, Take 1 tablet (50 mg) by mouth as needed at bedtime for sleep., Disp: 90 tablet, Rfl: 1    valACYclovir (Valtrex) 1 gram tablet, Take 1 tablet (1,000 mg) by mouth 2 times a day. As needed., Disp: 90 tablet, Rfl: 1    Patient Active Problem List   Diagnosis    Actinic keratosis    Arthralgia    ASCUS with positive high risk HPV cervical    Bursitis of hip, right    Changing skin lesion    Dyspepsia    Dyshidrosis    Eczema    Herpes dermatitis  "   Hallux valgus with bunions of right foot    Chronic insomnia    Menorrhagia    Metatarsalgia    Postmenopausal bleeding    Rotator cuff tendonitis    Tanning bed exposure    Telangiectasia    Tinea pedis    Type 2 diabetes mellitus (Multi)    Vitamin D deficiency    Generalized anxiety disorder    MDD (major depressive disorder), recurrent severe, without psychosis (Multi)    Onychomycosis         Recent Results (from the past 672 hour(s))   POCT glycosylated hemoglobin (Hb A1C) manually resulted    Collection Time: 08/08/24  3:38 PM   Result Value Ref Range    POC HEMOGLOBIN A1c 8.2 (A) 4.2 - 6.5 %        Objective    Visit Vitals    Visit Vitals  /90   Pulse 85   Temp 36.6 °C (97.9 °F)   Resp 16   Ht 1.6 m (5' 3\")   Wt 88.5 kg (195 lb)   BMI 34.54 kg/m²   Smoking Status Former   BSA 1.98 m²       Body mass index is 34.54 kg/m².     Physical Exam   General - Not in acute distress and cooperative.  Build & Nutrition - Well developed  Posture - Normal  Gait - Normal  Mental Status - alert and oriented x 3    Head - Normocephalic    Neck - Thyroid normal size    Eyes - Bilateral - Sclera clear and lids pink without edema or mass.      Skin - Warm and dry with no rashes on visible skin    Lungs - Clear to auscultation and normal breathing effort    Cardiovascular - RRR and no murmurs, rubs or thrill.    Peripheral Vascular - Bilateral - no edema present    Neuropsychiatric - normal mood and affect        Assessment    1. Generalized anxiety disorder        2. MDD (major depressive disorder), recurrent severe, without psychosis (Multi)  escitalopram (Lexapro) 20 mg tablet   Condition well controlled.  No change in current treatment regimen.  Refill given of current medication.  Make a follow up appointment with me for recheck in 6 months.       3. Type 2 diabetes mellitus without complication, without long-term current use of insulin (Multi)  metFORMIN (Glucophage) 500 mg tablet, valACYclovir (Valtrex) 1 gram " tablet, POCT glycosylated hemoglobin (Hb A1C) manually resulted   Suboptimal control of condition.  Will modify treatment by increasing metformin up to 500 mg twice daily.  We also discussed her diet and stress eating and ways to lower her glucose through diet and exercise.  We will recheck in 4 months.     4. Chronic insomnia  traZODone (Desyrel) 50 mg tablet   Condition well controlled.  No change in current treatment regimen.  Refill given of current medication.  Make a follow up appointment with me for recheck in 6 months.       5. Herpes dermatitis  valACYclovir (Valtrex) 1 gram tablet   Condition well controlled.  No change in current treatment regimen.  Refill given of current medication.  Make a follow up appointment with me for recheck in 6 months.       6. Dyshidrosis  ammonium lactate (Lac-Hydrin) 12 % lotion   She has severe dryness and calluses on her feet and we have previously used Lac-Hydrin with good results.  She asked for refill of this medication.         Orders Placed This Encounter      ammonium lactate (Lac-Hydrin) 12 % lotion      escitalopram (Lexapro) 20 mg tablet      metFORMIN (Glucophage) 500 mg tablet      traZODone (Desyrel) 50 mg tablet      valACYclovir (Valtrex) 1 gram tablet       Orders Placed This Encounter   Procedures    POCT glycosylated hemoglobin (Hb A1C) manually resulted        New Medications Ordered This Visit   Medications    escitalopram (Lexapro) 20 mg tablet     Sig: Take 1 tablet (20 mg) by mouth once daily.     Dispense:  90 tablet     Refill:  1    metFORMIN (Glucophage) 500 mg tablet     Sig: Take 1 tablet (500 mg) by mouth 2 times daily (morning and late afternoon).     Dispense:  180 tablet     Refill:  1    traZODone (Desyrel) 50 mg tablet     Sig: Take 1 tablet (50 mg) by mouth as needed at bedtime for sleep.     Dispense:  90 tablet     Refill:  1    valACYclovir (Valtrex) 1 gram tablet     Sig: Take 1 tablet (1,000 mg) by mouth 2 times a day. As needed.      Dispense:  90 tablet     Refill:  1    ammonium lactate (Lac-Hydrin) 12 % lotion     Sig: Apply 2 times daily as needed     Dispense:  225 g     Refill:  6

## 2025-01-09 ENCOUNTER — APPOINTMENT (OUTPATIENT)
Dept: PRIMARY CARE | Facility: CLINIC | Age: 55
End: 2025-01-09

## 2025-02-07 ENCOUNTER — APPOINTMENT (OUTPATIENT)
Dept: OBSTETRICS AND GYNECOLOGY | Facility: CLINIC | Age: 55
End: 2025-02-07

## 2025-02-14 ENCOUNTER — APPOINTMENT (OUTPATIENT)
Dept: OBSTETRICS AND GYNECOLOGY | Facility: CLINIC | Age: 55
End: 2025-02-14

## 2025-02-17 ENCOUNTER — APPOINTMENT (OUTPATIENT)
Dept: OBSTETRICS AND GYNECOLOGY | Facility: CLINIC | Age: 55
End: 2025-02-17
Payer: COMMERCIAL

## 2025-02-17 VITALS
WEIGHT: 193.4 LBS | DIASTOLIC BLOOD PRESSURE: 77 MMHG | BODY MASS INDEX: 34.27 KG/M2 | HEIGHT: 63 IN | SYSTOLIC BLOOD PRESSURE: 116 MMHG

## 2025-02-17 DIAGNOSIS — N95.0 POSTMENOPAUSAL BLEEDING: Primary | ICD-10-CM

## 2025-02-17 DIAGNOSIS — Z12.39 BREAST SCREENING: ICD-10-CM

## 2025-02-17 PROCEDURE — 3074F SYST BP LT 130 MM HG: CPT | Performed by: OBSTETRICS & GYNECOLOGY

## 2025-02-17 PROCEDURE — 3078F DIAST BP <80 MM HG: CPT | Performed by: OBSTETRICS & GYNECOLOGY

## 2025-02-17 PROCEDURE — 3008F BODY MASS INDEX DOCD: CPT | Performed by: OBSTETRICS & GYNECOLOGY

## 2025-02-17 PROCEDURE — 99213 OFFICE O/P EST LOW 20 MIN: CPT | Performed by: OBSTETRICS & GYNECOLOGY

## 2025-02-17 RX ORDER — MISOPROSTOL 200 UG/1
200 TABLET ORAL DAILY
Qty: 2 TABLET | Refills: 0 | Status: SHIPPED | OUTPATIENT
Start: 2025-02-17 | End: 2025-02-20 | Stop reason: ALTCHOICE

## 2025-02-17 ASSESSMENT — ENCOUNTER SYMPTOMS
DEPRESSION: 0
OCCASIONAL FEELINGS OF UNSTEADINESS: 0
LOSS OF SENSATION IN FEET: 0

## 2025-02-17 NOTE — PROGRESS NOTES
Subjective   Patient ID: Rosanna Ling is a 54 y.o. female who presents for Vaginal Bleeding (Pt is here to discuss postmenopausal bleeding. She states she had period like bleeding that lasted one week. /Varsha Neves MA/).  HPI  Pt presents for PMB.  Pt states that she just vaginal bleeding that was just like a period, it lasted 1 wk.  She states the last time she had any bleeding was 2 1/2 years ago.  No other problems, no other symptoms.    Review of Systems  all other symptoms were found to be neg except for HPI/CC.      Objective   Physical Exam  General  General Appearance - normal build and Well groomed, Not in acute distress, No acute respiratory distress.  Mental Status - Alert.    Integumentary  - - warm and dry with no rashes.    Head and Neck  - - normalocephalic.    Eye  - - Bilateral - pupils equal and round and sclera clear.    Chest and Lung Exam  - - Bilateral - normal breathing effort.    Musculoskeletal  - - normal posture and normal gait and station.      Assessment/Plan   Diagnoses and all orders for this visit:  Postmenopausal bleeding  -     US PELVIS TRANSABDOMINAL WITH TRANSVAGINAL; Future  -     miSOPROStoL (Cytotec) 200 mcg tablet; Take 1 tablet (200 mcg) by mouth once daily for 2 doses. 1 PO the night before and 1 PO the day of the procedure.  Breast screening  -     BI mammo bilateral screening tomosynthesis; Future     Mamm ordered  US ordered  Talked about EMB, Pt doesn't want today but will schedule it.  Talked about hysterectomy if everything neg but continues to bleed.    Pt is to call with any questions of concerns.   F/U for EMB

## 2025-02-20 ENCOUNTER — APPOINTMENT (OUTPATIENT)
Dept: PRIMARY CARE | Facility: CLINIC | Age: 55
End: 2025-02-20

## 2025-02-20 VITALS
SYSTOLIC BLOOD PRESSURE: 128 MMHG | HEART RATE: 78 BPM | RESPIRATION RATE: 18 BRPM | TEMPERATURE: 97 F | BODY MASS INDEX: 34.2 KG/M2 | DIASTOLIC BLOOD PRESSURE: 76 MMHG | WEIGHT: 193 LBS | HEIGHT: 63 IN

## 2025-02-20 DIAGNOSIS — L30.1 DYSHIDROSIS: ICD-10-CM

## 2025-02-20 DIAGNOSIS — F51.04 CHRONIC INSOMNIA: ICD-10-CM

## 2025-02-20 DIAGNOSIS — B00.89 HERPES DERMATITIS: ICD-10-CM

## 2025-02-20 DIAGNOSIS — E11.9 TYPE 2 DIABETES MELLITUS WITHOUT COMPLICATION, WITHOUT LONG-TERM CURRENT USE OF INSULIN (MULTI): ICD-10-CM

## 2025-02-20 DIAGNOSIS — F33.2 MDD (MAJOR DEPRESSIVE DISORDER), RECURRENT SEVERE, WITHOUT PSYCHOSIS (MULTI): ICD-10-CM

## 2025-02-20 LAB — POC HEMOGLOBIN A1C: 8.9 % (ref 4.2–6.5)

## 2025-02-20 PROCEDURE — 3008F BODY MASS INDEX DOCD: CPT | Performed by: FAMILY MEDICINE

## 2025-02-20 PROCEDURE — 3078F DIAST BP <80 MM HG: CPT | Performed by: FAMILY MEDICINE

## 2025-02-20 PROCEDURE — 99214 OFFICE O/P EST MOD 30 MIN: CPT | Performed by: FAMILY MEDICINE

## 2025-02-20 PROCEDURE — 3074F SYST BP LT 130 MM HG: CPT | Performed by: FAMILY MEDICINE

## 2025-02-20 PROCEDURE — 83036 HEMOGLOBIN GLYCOSYLATED A1C: CPT | Performed by: FAMILY MEDICINE

## 2025-02-20 RX ORDER — OFLOXACIN 3 MG/ML
SOLUTION/ DROPS OPHTHALMIC
COMMUNITY
Start: 2025-02-17

## 2025-02-20 RX ORDER — TRAZODONE HYDROCHLORIDE 50 MG/1
50 TABLET ORAL NIGHTLY PRN
Qty: 90 TABLET | Refills: 1 | Status: SHIPPED | OUTPATIENT
Start: 2025-02-20

## 2025-02-20 RX ORDER — VALACYCLOVIR HYDROCHLORIDE 1 G/1
1000 TABLET, FILM COATED ORAL 2 TIMES DAILY
Qty: 90 TABLET | Refills: 1 | Status: SHIPPED | OUTPATIENT
Start: 2025-02-20

## 2025-02-20 RX ORDER — METFORMIN HYDROCHLORIDE 500 MG/1
500 TABLET ORAL
Qty: 180 TABLET | Refills: 1 | Status: SHIPPED | OUTPATIENT
Start: 2025-02-20

## 2025-02-20 RX ORDER — PREDNISOLONE ACETATE 10 MG/ML
SUSPENSION/ DROPS OPHTHALMIC
COMMUNITY
Start: 2025-02-17

## 2025-02-20 RX ORDER — AMMONIUM LACTATE 12 G/100G
LOTION TOPICAL
Qty: 225 G | Refills: 6 | Status: SHIPPED | OUTPATIENT
Start: 2025-02-20

## 2025-02-20 RX ORDER — ESCITALOPRAM OXALATE 20 MG/1
20 TABLET ORAL DAILY
Qty: 90 TABLET | Refills: 1 | Status: SHIPPED | OUTPATIENT
Start: 2025-02-20

## 2025-02-20 NOTE — ASSESSMENT & PLAN NOTE
Condition well controlled.  No change in current treatment regimen.  Refill given of current medication.  Make a follow up appointment with me for recheck in 6 months.  Orders:    valACYclovir (Valtrex) 1 gram tablet; Take 1 tablet (1,000 mg) by mouth 2 times a day. As needed.

## 2025-02-20 NOTE — PROGRESS NOTES
Rosanna Ling is a 54 y.o. female here today for   Chief Complaint   Patient presents with    Depression    Diabetes    Insomnia    Fatigue           Diabetes recheck -- Patient feeling well.  No CP, numbness of feet, blurred vision, polyuria. No side effects from medications.  No hypoglycemic symptoms.  A1c has worsened and is 8.9 today.  It was 8.2 about 6 months ago.  We increased her metformin at her last visit 6 months ago.  She often forgets the evening metformin dose.  She admits that she has not been doing a good job with her diet and not really monitoring her carbohydrate intake.    Mood disorder recheck -- Patient feels like condition is well controlled.  Has good control of mood and emotional reactions.  No SE's or problems with medications.  No homicidal or suicidal ideation.  Patient wishes to continue same medications.      Low energy lately.  She sleeps 8 hours per night.      Recheck insomnia --she takes trazodone nightly and has been sleeping well with this medication.      Current Outpatient Medications:     ofloxacin (Ocuflox) 0.3 % ophthalmic solution, , Disp: , Rfl:     prednisoLONE acetate (Pred-Forte) 1 % ophthalmic suspension, , Disp: , Rfl:     ammonium lactate (Lac-Hydrin) 12 % lotion, Apply 2 times daily as needed, Disp: 225 g, Rfl: 6    escitalopram (Lexapro) 20 mg tablet, Take 1 tablet (20 mg) by mouth once daily., Disp: 90 tablet, Rfl: 1    metFORMIN (Glucophage) 500 mg tablet, Take 1 tablet (500 mg) by mouth 2 times daily (morning and late afternoon)., Disp: 180 tablet, Rfl: 1    semaglutide 0.25 mg or 0.5 mg (2 mg/3 mL) pen injector, Inject 0.25 mg under the skin 1 (one) time per week., Disp: 3 mL, Rfl: 0    traZODone (Desyrel) 50 mg tablet, Take 1 tablet (50 mg) by mouth as needed at bedtime for sleep., Disp: 90 tablet, Rfl: 1    valACYclovir (Valtrex) 1 gram tablet, Take 1 tablet (1,000 mg) by mouth 2 times a day. As needed., Disp: 90 tablet, Rfl: 1    Patient Active Problem List  "  Diagnosis    Actinic keratosis    Arthralgia    ASCUS with positive high risk HPV cervical    Bursitis of hip, right    Changing skin lesion    Dyspepsia    Dyshidrosis    Eczema    Herpes dermatitis    Hallux valgus with bunions of right foot    Chronic insomnia    Menorrhagia    Metatarsalgia    Postmenopausal bleeding    Rotator cuff tendonitis    Tanning bed exposure    Telangiectasia    Tinea pedis    Type 2 diabetes mellitus    Vitamin D deficiency    Generalized anxiety disorder    MDD (major depressive disorder), recurrent severe, without psychosis (Multi)    Onychomycosis         Objective    Visit Vitals    Visit Vitals  /76   Pulse 78   Temp 36.1 °C (97 °F)   Resp 18   Ht 1.6 m (5' 3\")   Wt 87.5 kg (193 lb)   BMI 34.19 kg/m²   Smoking Status Former   BSA 1.97 m²       Body mass index is 34.19 kg/m².     Physical Exam     General - Not in acute distress and cooperative.  Build & Nutrition - Well developed  Posture - Normal  Gait - Normal  Mental Status - alert and oriented x 3    Head - Normocephalic    Neck - Thyroid normal size    Eyes - Bilateral - Sclera clear and lids pink without edema or mass.      Skin - Warm and dry with no rashes on visible skin    Lungs - Clear to auscultation and normal breathing effort    Cardiovascular - RRR and no murmurs, rubs or thrill.    Peripheral Vascular - Bilateral - no edema present    Neuropsychiatric - normal mood and affect        Assessment & Plan  Type 2 diabetes mellitus without complication, without long-term current use of insulin (Multi)  Suboptimal control of condition.  Will modify treatment by adding semaglutide Ozempic 0.25 mg weekly subcutaneously.  She will continue the same dose of metformin.  We discussed ways to help her remember to take her evening dose.  We also discussed ways to change her diet.  I reminded her of the dangers of poorly controlled diabetes and why it is so important to control her glucose levels.  We watched a video on " how to self inject Ozempic on YouTube and I answered all of her questions.  I recommend that she start the Ozempic and call us in 3 weeks with an update.  If she is tolerating it well then we will increase to 0.5 mg weekly and follow-up in 2 months.  There is no personal or family history of thyroid or pancreatic cancer.    Orders:    POCT glycosylated hemoglobin (Hb A1C) manually resulted    semaglutide 0.25 mg or 0.5 mg (2 mg/3 mL) pen injector; Inject 0.25 mg under the skin 1 (one) time per week.    metFORMIN (Glucophage) 500 mg tablet; Take 1 tablet (500 mg) by mouth 2 times daily (morning and late afternoon).    valACYclovir (Valtrex) 1 gram tablet; Take 1 tablet (1,000 mg) by mouth 2 times a day. As needed.    CBC; Future    Comprehensive Metabolic Panel; Future    Lipid Panel; Future    Albumin-Creatinine Ratio, Urine Random; Future    Dyshidrosis  Condition well controlled.  No change in current treatment regimen.  Refill given of current medication.  Make a follow up appointment with me for recheck in 6 months.  Orders:    ammonium lactate (Lac-Hydrin) 12 % lotion; Apply 2 times daily as needed    Chronic insomnia  Condition well controlled.  No change in current treatment regimen.  Refill given of current medication.  Make a follow up appointment with me for recheck in 6 months.  Orders:    traZODone (Desyrel) 50 mg tablet; Take 1 tablet (50 mg) by mouth as needed at bedtime for sleep.    MDD (major depressive disorder), recurrent severe, without psychosis (Multi)  Condition well controlled.  No change in current treatment regimen.  Refill given of current medication.  Make a follow up appointment with me for recheck in 6 months.  Orders:    escitalopram (Lexapro) 20 mg tablet; Take 1 tablet (20 mg) by mouth once daily.    Herpes dermatitis  Condition well controlled.  No change in current treatment regimen.  Refill given of current medication.  Make a follow up appointment with me for recheck in 6  months.  Orders:    valACYclovir (Valtrex) 1 gram tablet; Take 1 tablet (1,000 mg) by mouth 2 times a day. As needed.         Orders Placed This Encounter      ammonium lactate (Lac-Hydrin) 12 % lotion      escitalopram (Lexapro) 20 mg tablet      metFORMIN (Glucophage) 500 mg tablet      semaglutide 0.25 mg or 0.5 mg (2 mg/3 mL) pen injector      traZODone (Desyrel) 50 mg tablet      valACYclovir (Valtrex) 1 gram tablet       Orders Placed This Encounter   Procedures    CBC    Comprehensive Metabolic Panel    Lipid Panel    Albumin-Creatinine Ratio, Urine Random    POCT glycosylated hemoglobin (Hb A1C) manually resulted        New Medications Ordered This Visit   Medications    prednisoLONE acetate (Pred-Forte) 1 % ophthalmic suspension    ofloxacin (Ocuflox) 0.3 % ophthalmic solution    semaglutide 0.25 mg or 0.5 mg (2 mg/3 mL) pen injector     Sig: Inject 0.25 mg under the skin 1 (one) time per week.     Dispense:  3 mL     Refill:  0    metFORMIN (Glucophage) 500 mg tablet     Sig: Take 1 tablet (500 mg) by mouth 2 times daily (morning and late afternoon).     Dispense:  180 tablet     Refill:  1    ammonium lactate (Lac-Hydrin) 12 % lotion     Sig: Apply 2 times daily as needed     Dispense:  225 g     Refill:  6    traZODone (Desyrel) 50 mg tablet     Sig: Take 1 tablet (50 mg) by mouth as needed at bedtime for sleep.     Dispense:  90 tablet     Refill:  1    escitalopram (Lexapro) 20 mg tablet     Sig: Take 1 tablet (20 mg) by mouth once daily.     Dispense:  90 tablet     Refill:  1    valACYclovir (Valtrex) 1 gram tablet     Sig: Take 1 tablet (1,000 mg) by mouth 2 times a day. As needed.     Dispense:  90 tablet     Refill:  1

## 2025-02-20 NOTE — ASSESSMENT & PLAN NOTE
Condition well controlled.  No change in current treatment regimen.  Refill given of current medication.  Make a follow up appointment with me for recheck in 6 months.  Orders:    escitalopram (Lexapro) 20 mg tablet; Take 1 tablet (20 mg) by mouth once daily.

## 2025-02-20 NOTE — ASSESSMENT & PLAN NOTE
Condition well controlled.  No change in current treatment regimen.  Refill given of current medication.  Make a follow up appointment with me for recheck in 6 months.  Orders:    ammonium lactate (Lac-Hydrin) 12 % lotion; Apply 2 times daily as needed

## 2025-02-20 NOTE — ASSESSMENT & PLAN NOTE
Condition well controlled.  No change in current treatment regimen.  Refill given of current medication.  Make a follow up appointment with me for recheck in 6 months.  Orders:    traZODone (Desyrel) 50 mg tablet; Take 1 tablet (50 mg) by mouth as needed at bedtime for sleep.

## 2025-02-20 NOTE — ASSESSMENT & PLAN NOTE
Suboptimal control of condition.  Will modify treatment by adding semaglutide Ozempic 0.25 mg weekly subcutaneously.  She will continue the same dose of metformin.  We discussed ways to help her remember to take her evening dose.  We also discussed ways to change her diet.  I reminded her of the dangers of poorly controlled diabetes and why it is so important to control her glucose levels.  We watched a video on how to self inject Ozempic on YouTube and I answered all of her questions.  I recommend that she start the Ozempic and call us in 3 weeks with an update.  If she is tolerating it well then we will increase to 0.5 mg weekly and follow-up in 2 months.  There is no personal or family history of thyroid or pancreatic cancer.    Orders:    POCT glycosylated hemoglobin (Hb A1C) manually resulted    semaglutide 0.25 mg or 0.5 mg (2 mg/3 mL) pen injector; Inject 0.25 mg under the skin 1 (one) time per week.    metFORMIN (Glucophage) 500 mg tablet; Take 1 tablet (500 mg) by mouth 2 times daily (morning and late afternoon).    valACYclovir (Valtrex) 1 gram tablet; Take 1 tablet (1,000 mg) by mouth 2 times a day. As needed.    CBC; Future    Comprehensive Metabolic Panel; Future    Lipid Panel; Future    Albumin-Creatinine Ratio, Urine Random; Future

## 2025-02-21 ENCOUNTER — HOSPITAL ENCOUNTER (OUTPATIENT)
Dept: RADIOLOGY | Facility: CLINIC | Age: 55
Discharge: HOME | End: 2025-02-21
Payer: COMMERCIAL

## 2025-02-21 DIAGNOSIS — N95.0 POSTMENOPAUSAL BLEEDING: ICD-10-CM

## 2025-02-21 DIAGNOSIS — Z12.39 BREAST SCREENING: ICD-10-CM

## 2025-02-21 PROCEDURE — 77067 SCR MAMMO BI INCL CAD: CPT

## 2025-02-21 PROCEDURE — 76856 US EXAM PELVIC COMPLETE: CPT

## 2025-02-23 LAB
ALBUMIN SERPL-MCNC: 4.7 G/DL (ref 3.6–5.1)
ALBUMIN/CREAT UR: NORMAL
ALP SERPL-CCNC: 53 U/L (ref 37–153)
ALT SERPL-CCNC: 19 U/L (ref 6–29)
ANION GAP SERPL CALCULATED.4IONS-SCNC: 11 MMOL/L (CALC) (ref 7–17)
AST SERPL-CCNC: 13 U/L (ref 10–35)
BILIRUB SERPL-MCNC: 0.4 MG/DL (ref 0.2–1.2)
BUN SERPL-MCNC: 15 MG/DL (ref 7–25)
CALCIUM SERPL-MCNC: 9.2 MG/DL (ref 8.6–10.4)
CHLORIDE SERPL-SCNC: 104 MMOL/L (ref 98–110)
CHOLEST SERPL-MCNC: 187 MG/DL
CHOLEST/HDLC SERPL: 3.7 (CALC)
CO2 SERPL-SCNC: 23 MMOL/L (ref 20–32)
CREAT SERPL-MCNC: 0.51 MG/DL (ref 0.5–1.03)
CREAT UR-MCNC: NORMAL MG/DL
EGFRCR SERPLBLD CKD-EPI 2021: 111 ML/MIN/1.73M2
ERYTHROCYTE [DISTWIDTH] IN BLOOD BY AUTOMATED COUNT: 12.8 % (ref 11–15)
GLUCOSE SERPL-MCNC: 178 MG/DL (ref 65–99)
HCT VFR BLD AUTO: 43.1 % (ref 35–45)
HDLC SERPL-MCNC: 50 MG/DL
HGB BLD-MCNC: 14.7 G/DL (ref 11.7–15.5)
LDLC SERPL CALC-MCNC: 109 MG/DL (CALC)
MCH RBC QN AUTO: 30.3 PG (ref 27–33)
MCHC RBC AUTO-ENTMCNC: 34.1 G/DL (ref 32–36)
MCV RBC AUTO: 88.9 FL (ref 80–100)
MICROALBUMIN UR-MCNC: NORMAL
NONHDLC SERPL-MCNC: 137 MG/DL (CALC)
PLATELET # BLD AUTO: 271 THOUSAND/UL (ref 140–400)
PMV BLD REES-ECKER: 10.1 FL (ref 7.5–12.5)
POTASSIUM SERPL-SCNC: 4.5 MMOL/L (ref 3.5–5.3)
PROT SERPL-MCNC: 6.8 G/DL (ref 6.1–8.1)
RBC # BLD AUTO: 4.85 MILLION/UL (ref 3.8–5.1)
SODIUM SERPL-SCNC: 138 MMOL/L (ref 135–146)
TRIGL SERPL-MCNC: 163 MG/DL
WBC # BLD AUTO: 10.4 THOUSAND/UL (ref 3.8–10.8)

## 2025-02-24 LAB
ALBUMIN SERPL-MCNC: 4.7 G/DL (ref 3.6–5.1)
ALBUMIN/CREAT UR: 11 MG/G CREAT
ALP SERPL-CCNC: 53 U/L (ref 37–153)
ALT SERPL-CCNC: 19 U/L (ref 6–29)
ANION GAP SERPL CALCULATED.4IONS-SCNC: 11 MMOL/L (CALC) (ref 7–17)
AST SERPL-CCNC: 13 U/L (ref 10–35)
BILIRUB SERPL-MCNC: 0.4 MG/DL (ref 0.2–1.2)
BUN SERPL-MCNC: 15 MG/DL (ref 7–25)
CALCIUM SERPL-MCNC: 9.2 MG/DL (ref 8.6–10.4)
CHLORIDE SERPL-SCNC: 104 MMOL/L (ref 98–110)
CHOLEST SERPL-MCNC: 187 MG/DL
CHOLEST/HDLC SERPL: 3.7 (CALC)
CO2 SERPL-SCNC: 23 MMOL/L (ref 20–32)
CREAT SERPL-MCNC: 0.51 MG/DL (ref 0.5–1.03)
CREAT UR-MCNC: 84 MG/DL (ref 20–275)
EGFRCR SERPLBLD CKD-EPI 2021: 111 ML/MIN/1.73M2
ERYTHROCYTE [DISTWIDTH] IN BLOOD BY AUTOMATED COUNT: 12.8 % (ref 11–15)
GLUCOSE SERPL-MCNC: 178 MG/DL (ref 65–99)
HCT VFR BLD AUTO: 43.1 % (ref 35–45)
HDLC SERPL-MCNC: 50 MG/DL
HGB BLD-MCNC: 14.7 G/DL (ref 11.7–15.5)
LDLC SERPL CALC-MCNC: 109 MG/DL (CALC)
MCH RBC QN AUTO: 30.3 PG (ref 27–33)
MCHC RBC AUTO-ENTMCNC: 34.1 G/DL (ref 32–36)
MCV RBC AUTO: 88.9 FL (ref 80–100)
MICROALBUMIN UR-MCNC: 0.9 MG/DL
NONHDLC SERPL-MCNC: 137 MG/DL (CALC)
PLATELET # BLD AUTO: 271 THOUSAND/UL (ref 140–400)
PMV BLD REES-ECKER: 10.1 FL (ref 7.5–12.5)
POTASSIUM SERPL-SCNC: 4.5 MMOL/L (ref 3.5–5.3)
PROT SERPL-MCNC: 6.8 G/DL (ref 6.1–8.1)
RBC # BLD AUTO: 4.85 MILLION/UL (ref 3.8–5.1)
SODIUM SERPL-SCNC: 138 MMOL/L (ref 135–146)
TRIGL SERPL-MCNC: 163 MG/DL
WBC # BLD AUTO: 10.4 THOUSAND/UL (ref 3.8–10.8)

## 2025-02-28 ENCOUNTER — APPOINTMENT (OUTPATIENT)
Dept: OBSTETRICS AND GYNECOLOGY | Facility: CLINIC | Age: 55
End: 2025-02-28
Payer: COMMERCIAL

## 2025-03-12 DIAGNOSIS — E11.9 TYPE 2 DIABETES MELLITUS WITHOUT COMPLICATION, WITHOUT LONG-TERM CURRENT USE OF INSULIN (MULTI): ICD-10-CM

## 2025-04-04 ENCOUNTER — APPOINTMENT (OUTPATIENT)
Dept: OBSTETRICS AND GYNECOLOGY | Facility: CLINIC | Age: 55
End: 2025-04-04

## 2025-04-18 ENCOUNTER — APPOINTMENT (OUTPATIENT)
Dept: OBSTETRICS AND GYNECOLOGY | Facility: CLINIC | Age: 55
End: 2025-04-18

## 2025-04-24 ENCOUNTER — APPOINTMENT (OUTPATIENT)
Dept: PRIMARY CARE | Facility: CLINIC | Age: 55
End: 2025-04-24
Payer: COMMERCIAL

## 2025-05-05 ENCOUNTER — APPOINTMENT (OUTPATIENT)
Dept: PRIMARY CARE | Facility: CLINIC | Age: 55
End: 2025-05-05
Payer: COMMERCIAL

## 2025-05-05 VITALS
RESPIRATION RATE: 16 BRPM | WEIGHT: 181 LBS | BODY MASS INDEX: 32.07 KG/M2 | TEMPERATURE: 97.6 F | DIASTOLIC BLOOD PRESSURE: 80 MMHG | HEART RATE: 76 BPM | HEIGHT: 63 IN | SYSTOLIC BLOOD PRESSURE: 124 MMHG

## 2025-05-05 DIAGNOSIS — E11.9 TYPE 2 DIABETES MELLITUS WITHOUT COMPLICATION, WITHOUT LONG-TERM CURRENT USE OF INSULIN: ICD-10-CM

## 2025-05-05 LAB — POC HEMOGLOBIN A1C: 6.4 % (ref 4.2–6.5)

## 2025-05-05 PROCEDURE — 99213 OFFICE O/P EST LOW 20 MIN: CPT | Performed by: FAMILY MEDICINE

## 2025-05-05 PROCEDURE — 3074F SYST BP LT 130 MM HG: CPT | Performed by: FAMILY MEDICINE

## 2025-05-05 PROCEDURE — 3044F HG A1C LEVEL LT 7.0%: CPT | Performed by: FAMILY MEDICINE

## 2025-05-05 PROCEDURE — 83036 HEMOGLOBIN GLYCOSYLATED A1C: CPT | Performed by: FAMILY MEDICINE

## 2025-05-05 PROCEDURE — 3008F BODY MASS INDEX DOCD: CPT | Performed by: FAMILY MEDICINE

## 2025-05-05 PROCEDURE — 3079F DIAST BP 80-89 MM HG: CPT | Performed by: FAMILY MEDICINE

## 2025-05-05 PROCEDURE — G8433 SCR FOR DEP NOT CPT DOC RSN: HCPCS | Performed by: FAMILY MEDICINE

## 2025-05-05 NOTE — PROGRESS NOTES
"Rosanna Ling is a 54 y.o. female here today for   Chief Complaint   Patient presents with    Diabetes     2 month recheck         HPI     Diabetes recheck -- Patient feeling well.  No CP, numbness of feet, blurred vision, polyuria.  Trying to follow diet.  No side effects from medications.  No hypoglycemic symptoms.  Patient has been taking semaglutide injections weekly 0.5 mg.  She has lost about 12 pounds since starting it 2 months ago.  She is also taking metformin 500 mg twice daily.  A1C much improved to 6.4.  Has noticed decreased appetite with it too.  No SE's with semaglutide.          Current Outpatient Medications:     ammonium lactate (Lac-Hydrin) 12 % lotion, Apply 2 times daily as needed, Disp: 225 g, Rfl: 6    escitalopram (Lexapro) 20 mg tablet, Take 1 tablet (20 mg) by mouth once daily., Disp: 90 tablet, Rfl: 1    metFORMIN (Glucophage) 500 mg tablet, Take 1 tablet (500 mg) by mouth 2 times daily (morning and late afternoon)., Disp: 180 tablet, Rfl: 1    traZODone (Desyrel) 50 mg tablet, Take 1 tablet (50 mg) by mouth as needed at bedtime for sleep., Disp: 90 tablet, Rfl: 1    valACYclovir (Valtrex) 1 gram tablet, Take 1 tablet (1,000 mg) by mouth 2 times a day. As needed., Disp: 90 tablet, Rfl: 1    semaglutide 0.25 mg or 0.5 mg (2 mg/3 mL) pen injector, Inject 0.5 mg under the skin 1 (one) time per week., Disp: 3 mL, Rfl: 6    Problem List[1]      Objective    Visit Vitals    Visit Vitals  /80   Pulse 76   Temp 36.4 °C (97.6 °F)   Resp 16   Ht 1.6 m (5' 3\")   Wt 82.1 kg (181 lb)   LMP 10/01/2022   BMI 32.06 kg/m²   OB Status Postmenopausal   Smoking Status Former   BSA 1.91 m²       Body mass index is 32.06 kg/m².     Physical Exam     General - Not in acute distress and cooperative.  Build & Nutrition - Well developed  Posture - Normal  Gait - Normal  Mental Status - alert and oriented x 3    Head - Normocephalic    Neck - Thyroid normal size    Eyes - Bilateral - Sclera clear and lids pink " without edema or mass.      Skin - Warm and dry with no rashes on visible skin    Lungs - Clear to auscultation and normal breathing effort    Cardiovascular - RRR and no murmurs, rubs or thrill.    Peripheral Vascular - Bilateral - no edema present    Neuropsychiatric - normal mood and affect        Assessment & Plan  Type 2 diabetes mellitus without complication, without long-term current use of insulin  Her A1c has improved greatly and is now at goal.  No change in current treatment regimen.  Refill given of current medication.  Make a follow up appointment with me for recheck in 6 months.  Orders:    POCT glycosylated hemoglobin (Hb A1C) manually resulted    semaglutide 0.25 mg or 0.5 mg (2 mg/3 mL) pen injector; Inject 0.5 mg under the skin 1 (one) time per week.         Orders Placed This Encounter      semaglutide 0.25 mg or 0.5 mg (2 mg/3 mL) pen injector       Orders Placed This Encounter   Procedures    POCT glycosylated hemoglobin (Hb A1C) manually resulted        New Medications Ordered This Visit   Medications    semaglutide 0.25 mg or 0.5 mg (2 mg/3 mL) pen injector     Sig: Inject 0.5 mg under the skin 1 (one) time per week.     Dispense:  3 mL     Refill:  6               [1]   Patient Active Problem List  Diagnosis    Actinic keratosis    Arthralgia    ASCUS with positive high risk HPV cervical    Bursitis of hip, right    Changing skin lesion    Dyspepsia    Dyshidrosis    Eczema    Herpes dermatitis    Hallux valgus with bunions of right foot    Chronic insomnia    Menorrhagia    Metatarsalgia    Postmenopausal bleeding    Rotator cuff tendonitis    Tanning bed exposure    Telangiectasia    Tinea pedis    Type 2 diabetes mellitus    Vitamin D deficiency    Generalized anxiety disorder    MDD (major depressive disorder), recurrent severe, without psychosis (Multi)    Onychomycosis

## 2025-05-05 NOTE — ASSESSMENT & PLAN NOTE
Her A1c has improved greatly and is now at goal.  No change in current treatment regimen.  Refill given of current medication.  Make a follow up appointment with me for recheck in 6 months.  Orders:    POCT glycosylated hemoglobin (Hb A1C) manually resulted    semaglutide 0.25 mg or 0.5 mg (2 mg/3 mL) pen injector; Inject 0.5 mg under the skin 1 (one) time per week.

## 2025-06-24 ENCOUNTER — OFFICE VISIT (OUTPATIENT)
Dept: PRIMARY CARE | Facility: CLINIC | Age: 55
End: 2025-06-24
Payer: COMMERCIAL

## 2025-06-24 VITALS
DIASTOLIC BLOOD PRESSURE: 80 MMHG | TEMPERATURE: 97.2 F | SYSTOLIC BLOOD PRESSURE: 122 MMHG | HEART RATE: 68 BPM | WEIGHT: 172 LBS | RESPIRATION RATE: 16 BRPM | HEIGHT: 63 IN | BODY MASS INDEX: 30.48 KG/M2

## 2025-06-24 DIAGNOSIS — E55.9 VITAMIN D DEFICIENCY: ICD-10-CM

## 2025-06-24 DIAGNOSIS — R53.83 OTHER FATIGUE: ICD-10-CM

## 2025-06-24 DIAGNOSIS — L72.3 INFECTED SEBACEOUS CYST: Primary | ICD-10-CM

## 2025-06-24 DIAGNOSIS — L08.9 INFECTED SEBACEOUS CYST: Primary | ICD-10-CM

## 2025-06-24 PROCEDURE — 3074F SYST BP LT 130 MM HG: CPT | Performed by: FAMILY MEDICINE

## 2025-06-24 PROCEDURE — 3044F HG A1C LEVEL LT 7.0%: CPT | Performed by: FAMILY MEDICINE

## 2025-06-24 PROCEDURE — 3008F BODY MASS INDEX DOCD: CPT | Performed by: FAMILY MEDICINE

## 2025-06-24 PROCEDURE — 99214 OFFICE O/P EST MOD 30 MIN: CPT | Performed by: FAMILY MEDICINE

## 2025-06-24 PROCEDURE — G8433 SCR FOR DEP NOT CPT DOC RSN: HCPCS | Performed by: FAMILY MEDICINE

## 2025-06-24 PROCEDURE — 3079F DIAST BP 80-89 MM HG: CPT | Performed by: FAMILY MEDICINE

## 2025-06-24 RX ORDER — CEPHALEXIN 500 MG/1
500 CAPSULE ORAL 2 TIMES DAILY
Qty: 20 CAPSULE | Refills: 0 | Status: SHIPPED | OUTPATIENT
Start: 2025-06-24

## 2025-06-24 ASSESSMENT — PATIENT HEALTH QUESTIONNAIRE - PHQ9
SUM OF ALL RESPONSES TO PHQ9 QUESTIONS 1 AND 2: 0
2. FEELING DOWN, DEPRESSED OR HOPELESS: NOT AT ALL
1. LITTLE INTEREST OR PLEASURE IN DOING THINGS: NOT AT ALL

## 2025-06-24 NOTE — PROGRESS NOTES
Rosanna Ling is a 55 y.o. female here today for   Chief Complaint   Patient presents with    Skin Check     Painful spot on right shoulder, X6 weeks         HPI     Right anterior shoulder with small lump.  She says she first noticed it about 4 to 6 weeks ago and has slowly gotten a little bigger and is a little tender.  It has a central dark area which looks like a blackhead but she has tried to squeeze it and nothing comes out because it is too painful.  There is no pre-existing skin lesion or nevus in this area.    Increased fatgue.  She says that she feels more fatigue throughout the day over the last 4 to 6 months.  She is getting about 7 to 8 hours of sleep per night.  She has a previous history of vitamin D deficiency and she is not currently taking any vitamin D.  She says when she was vitamin D deficient in the past it made her feel tired like this so she asked if we can check some labs.    Current Outpatient Medications   Medication Instructions    ammonium lactate (Lac-Hydrin) 12 % lotion Apply 2 times daily as needed    cephalexin (KEFLEX) 500 mg, oral, 2 times daily    escitalopram (LEXAPRO) 20 mg, oral, Daily    metFORMIN (GLUCOPHAGE) 500 mg, oral, 2 times daily (morning and late afternoon)    semaglutide 0.5 mg, subcutaneous, Weekly    traZODone (DESYREL) 50 mg, oral, Nightly PRN    valACYclovir (VALTREX) 1,000 mg, oral, 2 times daily, As needed.       Patient Active Problem List    Diagnosis Date Noted    Chronic insomnia 07/14/2023    Type 2 diabetes mellitus 07/14/2023    MDD (major depressive disorder), recurrent severe, without psychosis (Multi) 12/07/2021    Generalized anxiety disorder 04/23/2007    Onychomycosis 08/07/2023    Actinic keratosis 07/14/2023    Arthralgia 07/14/2023    ASCUS with positive high risk HPV cervical 07/14/2023    Bursitis of hip, right 07/14/2023    Changing skin lesion 07/14/2023    Dyspepsia 07/14/2023    Dyshidrosis 07/14/2023    Eczema 07/14/2023    Herpes  "dermatitis 07/14/2023    Hallux valgus with bunions of right foot 07/14/2023    Menorrhagia 07/14/2023    Metatarsalgia 07/14/2023    Postmenopausal bleeding 07/14/2023    Rotator cuff tendonitis 07/14/2023    Tanning bed exposure 07/14/2023    Telangiectasia 07/14/2023    Tinea pedis 07/14/2023    Vitamin D deficiency 07/14/2023         Objective      Visit Vitals    Visit Vitals  /80   Pulse 68   Temp 36.2 °C (97.2 °F)   Resp 16   Ht 1.6 m (5' 3\")   Wt 78 kg (172 lb)   LMP 10/01/2022   BMI 30.47 kg/m²   OB Status Postmenopausal   Smoking Status Former   BSA 1.86 m²       Body mass index is 30.47 kg/m².     Physical Exam     Skin-right anterior shoulder with a 6 mm raised papule with a central what appears to be closed comedone.  The area is slightly erythematous and tender.  I tried to gently squeeze but could not express any pus or sebaceous material.  There is no pigmented lesion.    Assessment & Plan  Infected sebaceous cyst  This lesion on her right shoulder came up quickly and it appears to be an infected sebaceous cyst.  I am going to put her on Keflex and told her to monitor and leave the area alone.  We will follow-up in 10 to 14 days and I may do a procedure to open and drain the cyst at that time.  Orders:    cephalexin (Keflex) 500 mg capsule; Take 1 capsule (500 mg) by mouth 2 times a day.    Vitamin D deficiency  Appropriate labs ordered or reviewed.  Orders:    Vitamin D 25-Hydroxy,Total (for eval of Vitamin D levels); Future    Other fatigue  We will check some labs to further evaluate and rule out other etiologies and follow-up in 10 to 14 days.  Orders:    Thyroid Stimulating Hormone; Future    Ferritin; Future    Vitamin B12; Future         Orders Placed This Encounter      cephalexin (Keflex) 500 mg capsule       Orders Placed This Encounter   Procedures    Thyroid Stimulating Hormone    Vitamin D 25-Hydroxy,Total (for eval of Vitamin D levels)    Ferritin    Vitamin B12        New " Medications Ordered This Visit   Medications    cephalexin (Keflex) 500 mg capsule     Sig: Take 1 capsule (500 mg) by mouth 2 times a day.     Dispense:  20 capsule     Refill:  0

## 2025-06-29 LAB
25(OH)D3+25(OH)D2 SERPL-MCNC: 28 NG/ML (ref 30–100)
FERRITIN SERPL-MCNC: 94 NG/ML (ref 16–232)
TSH SERPL-ACNC: 1.23 MIU/L
VIT B12 SERPL-MCNC: 224 PG/ML (ref 200–1100)

## 2025-06-30 ENCOUNTER — TELEPHONE (OUTPATIENT)
Dept: PRIMARY CARE | Facility: CLINIC | Age: 55
End: 2025-06-30
Payer: COMMERCIAL

## 2025-06-30 DIAGNOSIS — E55.9 VITAMIN D DEFICIENCY: Primary | ICD-10-CM

## 2025-06-30 NOTE — TELEPHONE ENCOUNTER
"----- Message from Tommie Sanderson sent at 6/30/2025  8:31 AM EDT -----  Inform of low Vitamin D.  Take 2000 Units daily OTC.  Order Vitamin D 25 OH in 2 months and send requisition to patient.  Add Dx of \"Vitamin D deficiency\" to dx list and add medications in chart.  The rest of her labs were all normal.  ----- Message -----  From: [facility] Alert Results In  Sent: 6/29/2025   1:34 AM EDT  To: Tommie Sanderson MD    "

## 2025-07-03 ENCOUNTER — APPOINTMENT (OUTPATIENT)
Dept: PRIMARY CARE | Facility: CLINIC | Age: 55
End: 2025-07-03
Payer: COMMERCIAL

## 2025-07-03 VITALS
WEIGHT: 178 LBS | HEART RATE: 84 BPM | HEIGHT: 63 IN | DIASTOLIC BLOOD PRESSURE: 80 MMHG | RESPIRATION RATE: 16 BRPM | BODY MASS INDEX: 31.54 KG/M2 | SYSTOLIC BLOOD PRESSURE: 120 MMHG | TEMPERATURE: 97.3 F

## 2025-07-03 DIAGNOSIS — L72.3 INFECTED SEBACEOUS CYST: Primary | ICD-10-CM

## 2025-07-03 DIAGNOSIS — L08.9 INFECTED SEBACEOUS CYST: Primary | ICD-10-CM

## 2025-07-03 PROCEDURE — 3044F HG A1C LEVEL LT 7.0%: CPT | Performed by: FAMILY MEDICINE

## 2025-07-03 PROCEDURE — 3079F DIAST BP 80-89 MM HG: CPT | Performed by: FAMILY MEDICINE

## 2025-07-03 PROCEDURE — 1036F TOBACCO NON-USER: CPT | Performed by: FAMILY MEDICINE

## 2025-07-03 PROCEDURE — 3074F SYST BP LT 130 MM HG: CPT | Performed by: FAMILY MEDICINE

## 2025-07-03 PROCEDURE — 3008F BODY MASS INDEX DOCD: CPT | Performed by: FAMILY MEDICINE

## 2025-07-03 PROCEDURE — G8433 SCR FOR DEP NOT CPT DOC RSN: HCPCS | Performed by: FAMILY MEDICINE

## 2025-07-03 PROCEDURE — 99213 OFFICE O/P EST LOW 20 MIN: CPT | Performed by: FAMILY MEDICINE

## 2025-07-03 ASSESSMENT — PATIENT HEALTH QUESTIONNAIRE - PHQ9
1. LITTLE INTEREST OR PLEASURE IN DOING THINGS: NOT AT ALL
SUM OF ALL RESPONSES TO PHQ9 QUESTIONS 1 AND 2: 0
2. FEELING DOWN, DEPRESSED OR HOPELESS: NOT AT ALL

## 2025-07-03 NOTE — PROGRESS NOTES
"Rosanna Ling is a 55 y.o. female here today for   Chief Complaint   Patient presents with    Follow-up     Cyst         HPI     Patient with what appears to be a sebaceous cyst that is very superficial and bulging of the right anterior shoulder.  She did take the Keflex from the last visit but the area is still slightly tender when she touches it or bumps it.  She is here for me to drain it.    Current Outpatient Medications   Medication Instructions    ammonium lactate (Lac-Hydrin) 12 % lotion Apply 2 times daily as needed    cephalexin (KEFLEX) 500 mg, oral, 2 times daily    escitalopram (LEXAPRO) 20 mg, oral, Daily    metFORMIN (GLUCOPHAGE) 500 mg, oral, 2 times daily (morning and late afternoon)    semaglutide 0.5 mg, subcutaneous, Weekly    traZODone (DESYREL) 50 mg, oral, Nightly PRN    valACYclovir (VALTREX) 1,000 mg, oral, 2 times daily, As needed.       Patient Active Problem List    Diagnosis Date Noted    Chronic insomnia 07/14/2023    Type 2 diabetes mellitus 07/14/2023    MDD (major depressive disorder), recurrent severe, without psychosis (Multi) 12/07/2021    Generalized anxiety disorder 04/23/2007    Onychomycosis 08/07/2023    Actinic keratosis 07/14/2023    Arthralgia 07/14/2023    ASCUS with positive high risk HPV cervical 07/14/2023    Bursitis of hip, right 07/14/2023    Changing skin lesion 07/14/2023    Dyspepsia 07/14/2023    Dyshidrosis 07/14/2023    Eczema 07/14/2023    Herpes dermatitis 07/14/2023    Hallux valgus with bunions of right foot 07/14/2023    Menorrhagia 07/14/2023    Metatarsalgia 07/14/2023    Postmenopausal bleeding 07/14/2023    Rotator cuff tendonitis 07/14/2023    Tanning bed exposure 07/14/2023    Telangiectasia 07/14/2023    Tinea pedis 07/14/2023    Vitamin D deficiency 07/14/2023         Objective      Visit Vitals    Visit Vitals  /80   Pulse 84   Temp 36.3 °C (97.3 °F)   Resp 16   Ht 1.6 m (5' 3\")   Wt 80.7 kg (178 lb)   LMP 10/01/2022   BMI 31.53 kg/m²   OB " Status Postmenopausal   Smoking Status Former   BSA 1.89 m²       Body mass index is 31.53 kg/m².     Physical Exam         Assessment & Plan  Infected sebaceous cyst  This is a 8 mm skin papule with a central dark area that appears to be a comedone.  With sterile technique I removed the central comedone and tried to squeeze the papule to remove the sebaceous material.  There was no sebaceous material present.  I am not sure that this is a sebaceous cyst but the patient says there was definitely no foreign body or insect sting in this area.  I placed antibiotic ointment and a Band-Aid and we are going to recheck this in 1 month.  Depending how it looks I may refer her to dermatology for shave biopsy.  It came up very quickly so I do not think this is some type of skin cancer or basal cell carcinoma.  If this is a sebaceous cyst I think it will now smooth out over the next month since the comedone has been removed.                  No orders of the defined types were placed in this encounter.       No orders of the defined types were placed in this encounter.

## 2025-08-08 ENCOUNTER — APPOINTMENT (OUTPATIENT)
Dept: PRIMARY CARE | Facility: CLINIC | Age: 55
End: 2025-08-08
Payer: COMMERCIAL

## 2025-08-15 ENCOUNTER — OFFICE VISIT (OUTPATIENT)
Age: 55
End: 2025-08-15
Payer: COMMERCIAL

## 2025-08-15 VITALS
WEIGHT: 180 LBS | SYSTOLIC BLOOD PRESSURE: 110 MMHG | OXYGEN SATURATION: 99 % | HEART RATE: 68 BPM | DIASTOLIC BLOOD PRESSURE: 80 MMHG | HEIGHT: 63 IN | BODY MASS INDEX: 31.89 KG/M2 | TEMPERATURE: 97.2 F

## 2025-08-15 DIAGNOSIS — E11.9 TYPE 2 DIABETES MELLITUS WITHOUT COMPLICATION, WITHOUT LONG-TERM CURRENT USE OF INSULIN (HCC): Primary | ICD-10-CM

## 2025-08-15 DIAGNOSIS — F33.2 MDD (MAJOR DEPRESSIVE DISORDER), RECURRENT SEVERE, WITHOUT PSYCHOSIS (HCC): ICD-10-CM

## 2025-08-15 DIAGNOSIS — E78.00 PURE HYPERCHOLESTEROLEMIA: ICD-10-CM

## 2025-08-15 PROCEDURE — 99203 OFFICE O/P NEW LOW 30 MIN: CPT | Performed by: FAMILY MEDICINE

## 2025-08-15 RX ORDER — VALACYCLOVIR HYDROCHLORIDE 1 G/1
TABLET, FILM COATED ORAL
COMMUNITY

## 2025-08-15 RX ORDER — AVOBENZONE, HOMOSALATE, OCTISALATE, OCTOCRYLENE 30; 40; 45; 26 MG/ML; MG/ML; MG/ML; MG/ML
1 CREAM TOPICAL DAILY
Qty: 100 EACH | Refills: 5 | Status: SHIPPED | OUTPATIENT
Start: 2025-08-15

## 2025-08-15 RX ORDER — MULTIVITAMIN WITH IRON
1 TABLET ORAL DAILY
COMMUNITY

## 2025-08-15 RX ORDER — ESCITALOPRAM OXALATE 20 MG/1
20 TABLET ORAL DAILY
COMMUNITY
Start: 2025-05-18

## 2025-08-15 RX ORDER — GLUCOSAMINE HCL/CHONDROITIN SU 500-400 MG
CAPSULE ORAL
Qty: 100 STRIP | Refills: 5 | Status: SHIPPED | OUTPATIENT
Start: 2025-08-15

## 2025-08-15 RX ORDER — SEMAGLUTIDE 0.68 MG/ML
INJECTION, SOLUTION SUBCUTANEOUS
COMMUNITY

## 2025-08-15 SDOH — ECONOMIC STABILITY: FOOD INSECURITY: WITHIN THE PAST 12 MONTHS, THE FOOD YOU BOUGHT JUST DIDN'T LAST AND YOU DIDN'T HAVE MONEY TO GET MORE.: NEVER TRUE

## 2025-08-15 SDOH — ECONOMIC STABILITY: FOOD INSECURITY: WITHIN THE PAST 12 MONTHS, YOU WORRIED THAT YOUR FOOD WOULD RUN OUT BEFORE YOU GOT MONEY TO BUY MORE.: NEVER TRUE

## 2025-08-15 ASSESSMENT — PATIENT HEALTH QUESTIONNAIRE - PHQ9
7. TROUBLE CONCENTRATING ON THINGS, SUCH AS READING THE NEWSPAPER OR WATCHING TELEVISION: NOT AT ALL
SUM OF ALL RESPONSES TO PHQ QUESTIONS 1-9: 2
1. LITTLE INTEREST OR PLEASURE IN DOING THINGS: NOT AT ALL
2. FEELING DOWN, DEPRESSED OR HOPELESS: SEVERAL DAYS
4. FEELING TIRED OR HAVING LITTLE ENERGY: NOT AT ALL
5. POOR APPETITE OR OVEREATING: NOT AT ALL
9. THOUGHTS THAT YOU WOULD BE BETTER OFF DEAD, OR OF HURTING YOURSELF: NOT AT ALL
6. FEELING BAD ABOUT YOURSELF - OR THAT YOU ARE A FAILURE OR HAVE LET YOURSELF OR YOUR FAMILY DOWN: NOT AT ALL
3. TROUBLE FALLING OR STAYING ASLEEP: SEVERAL DAYS
SUM OF ALL RESPONSES TO PHQ QUESTIONS 1-9: 2
8. MOVING OR SPEAKING SO SLOWLY THAT OTHER PEOPLE COULD HAVE NOTICED. OR THE OPPOSITE, BEING SO FIGETY OR RESTLESS THAT YOU HAVE BEEN MOVING AROUND A LOT MORE THAN USUAL: NOT AT ALL

## 2025-08-15 ASSESSMENT — ENCOUNTER SYMPTOMS
VOMITING: 0
DIARRHEA: 0
COUGH: 0

## 2025-08-23 DIAGNOSIS — E78.00 PURE HYPERCHOLESTEROLEMIA: ICD-10-CM

## 2025-08-23 DIAGNOSIS — E11.9 TYPE 2 DIABETES MELLITUS WITHOUT COMPLICATION, WITHOUT LONG-TERM CURRENT USE OF INSULIN (HCC): ICD-10-CM

## 2025-08-23 LAB
CHOLEST SERPL-MCNC: 175 MG/DL (ref 0–199)
CREAT UR-MCNC: 57.9 MG/DL
HDLC SERPL-MCNC: 49 MG/DL (ref 40–59)
LDLC SERPL CALC-MCNC: 95 MG/DL (ref 0–129)
MICROALBUMIN UR-MCNC: <1.2 MG/DL
MICROALBUMIN/CREAT UR-RTO: NORMAL MG/G (ref 0–30)
TRIGL SERPL-MCNC: 153 MG/DL (ref 0–150)

## 2025-08-24 LAB
ESTIMATED AVERAGE GLUCOSE: 126 MG/DL
HBA1C MFR BLD: 6 % (ref 4–6)

## 2025-08-29 DIAGNOSIS — E55.9 VITAMIN D DEFICIENCY: ICD-10-CM

## 2025-09-05 ENCOUNTER — OFFICE VISIT (OUTPATIENT)
Age: 55
End: 2025-09-05
Payer: COMMERCIAL

## 2025-09-05 VITALS
SYSTOLIC BLOOD PRESSURE: 120 MMHG | WEIGHT: 176.8 LBS | HEIGHT: 63 IN | TEMPERATURE: 97.5 F | BODY MASS INDEX: 31.33 KG/M2 | OXYGEN SATURATION: 98 % | DIASTOLIC BLOOD PRESSURE: 82 MMHG | HEART RATE: 72 BPM

## 2025-09-05 DIAGNOSIS — E11.9 TYPE 2 DIABETES MELLITUS WITHOUT COMPLICATION, WITHOUT LONG-TERM CURRENT USE OF INSULIN (HCC): Primary | ICD-10-CM

## 2025-09-05 PROBLEM — E78.00 HYPERCHOLESTEREMIA: Status: ACTIVE | Noted: 2025-08-15

## 2025-09-05 PROCEDURE — 3044F HG A1C LEVEL LT 7.0%: CPT | Performed by: FAMILY MEDICINE

## 2025-09-05 PROCEDURE — 99213 OFFICE O/P EST LOW 20 MIN: CPT | Performed by: FAMILY MEDICINE

## 2025-09-05 RX ORDER — BLOOD-GLUCOSE METER
EACH MISCELLANEOUS
COMMUNITY
Start: 2025-08-15

## 2025-09-05 RX ORDER — ESCITALOPRAM OXALATE 20 MG/1
20 TABLET ORAL DAILY
Qty: 90 TABLET | Refills: 1 | Status: SHIPPED | OUTPATIENT
Start: 2025-09-05

## 2025-09-05 RX ORDER — TRAZODONE HYDROCHLORIDE 50 MG/1
50 TABLET ORAL NIGHTLY
Qty: 90 TABLET | Refills: 1 | Status: SHIPPED | OUTPATIENT
Start: 2025-09-05 | End: 2025-12-04

## 2025-09-05 RX ORDER — SEMAGLUTIDE 0.68 MG/ML
INJECTION, SOLUTION SUBCUTANEOUS
Qty: 6 ML | Refills: 1 | Status: ACTIVE | OUTPATIENT
Start: 2025-09-05

## 2025-09-05 ASSESSMENT — ENCOUNTER SYMPTOMS
DIARRHEA: 0
VOMITING: 0

## 2025-09-26 ENCOUNTER — APPOINTMENT (OUTPATIENT)
Dept: OBSTETRICS AND GYNECOLOGY | Facility: CLINIC | Age: 55
End: 2025-09-26
Payer: COMMERCIAL

## 2026-02-03 ENCOUNTER — APPOINTMENT (OUTPATIENT)
Dept: DERMATOLOGY | Facility: CLINIC | Age: 56
End: 2026-02-03
Payer: COMMERCIAL